# Patient Record
Sex: MALE | Race: OTHER | NOT HISPANIC OR LATINO | URBAN - METROPOLITAN AREA
[De-identification: names, ages, dates, MRNs, and addresses within clinical notes are randomized per-mention and may not be internally consistent; named-entity substitution may affect disease eponyms.]

---

## 2017-07-10 ENCOUNTER — INPATIENT (INPATIENT)
Facility: HOSPITAL | Age: 52
LOS: 2 days | Discharge: HOME | DRG: 246 | End: 2017-07-13
Attending: HOSPITALIST | Admitting: HOSPITALIST
Payer: MEDICAID

## 2017-07-10 VITALS
HEART RATE: 75 BPM | SYSTOLIC BLOOD PRESSURE: 108 MMHG | RESPIRATION RATE: 18 BRPM | OXYGEN SATURATION: 98 % | TEMPERATURE: 99 F | DIASTOLIC BLOOD PRESSURE: 72 MMHG

## 2017-07-10 DIAGNOSIS — R07.9 CHEST PAIN, UNSPECIFIED: ICD-10-CM

## 2017-07-10 DIAGNOSIS — I24.9 ACUTE ISCHEMIC HEART DISEASE, UNSPECIFIED: ICD-10-CM

## 2017-07-10 DIAGNOSIS — Z29.9 ENCOUNTER FOR PROPHYLACTIC MEASURES, UNSPECIFIED: ICD-10-CM

## 2017-07-10 DIAGNOSIS — I10 ESSENTIAL (PRIMARY) HYPERTENSION: ICD-10-CM

## 2017-07-10 DIAGNOSIS — I20.9 ANGINA PECTORIS, UNSPECIFIED: ICD-10-CM

## 2017-07-10 DIAGNOSIS — K21.9 GASTRO-ESOPHAGEAL REFLUX DISEASE WITHOUT ESOPHAGITIS: ICD-10-CM

## 2017-07-10 DIAGNOSIS — E78.5 HYPERLIPIDEMIA, UNSPECIFIED: ICD-10-CM

## 2017-07-10 LAB
ALBUMIN SERPL ELPH-MCNC: 4.5 G/DL — SIGNIFICANT CHANGE UP (ref 3.3–5)
ALP SERPL-CCNC: 45 U/L — SIGNIFICANT CHANGE UP (ref 40–120)
ALT FLD-CCNC: 69 U/L RC — HIGH (ref 10–45)
ANION GAP SERPL CALC-SCNC: 13 MMOL/L — SIGNIFICANT CHANGE UP (ref 5–17)
APTT BLD: 28.6 SEC — SIGNIFICANT CHANGE UP (ref 27.5–37.4)
APTT BLD: > 200 SEC (ref 27.5–37.4)
AST SERPL-CCNC: 33 U/L — SIGNIFICANT CHANGE UP (ref 10–40)
BASOPHILS # BLD AUTO: 0 K/UL — SIGNIFICANT CHANGE UP (ref 0–0.2)
BASOPHILS NFR BLD AUTO: 0.6 % — SIGNIFICANT CHANGE UP (ref 0–2)
BILIRUB SERPL-MCNC: 0.5 MG/DL — SIGNIFICANT CHANGE UP (ref 0.2–1.2)
BUN SERPL-MCNC: 17 MG/DL — SIGNIFICANT CHANGE UP (ref 7–23)
CALCIUM SERPL-MCNC: 9.3 MG/DL — SIGNIFICANT CHANGE UP (ref 8.4–10.5)
CHLORIDE SERPL-SCNC: 100 MMOL/L — SIGNIFICANT CHANGE UP (ref 96–108)
CHOLEST SERPL-MCNC: 140 MG/DL — SIGNIFICANT CHANGE UP (ref 10–199)
CK MB BLD-MCNC: 2.1 % — SIGNIFICANT CHANGE UP (ref 0–3.5)
CK MB CFR SERPL CALC: 2.2 NG/ML — SIGNIFICANT CHANGE UP (ref 0–6.7)
CK MB CFR SERPL CALC: 2.2 NG/ML — SIGNIFICANT CHANGE UP (ref 0–6.7)
CK MB CFR SERPL CALC: 2.4 NG/ML — SIGNIFICANT CHANGE UP (ref 0–6.7)
CK SERPL-CCNC: 106 U/L — SIGNIFICANT CHANGE UP (ref 30–200)
CK SERPL-CCNC: 82 U/L — SIGNIFICANT CHANGE UP (ref 30–200)
CK SERPL-CCNC: 87 U/L — SIGNIFICANT CHANGE UP (ref 30–200)
CO2 SERPL-SCNC: 25 MMOL/L — SIGNIFICANT CHANGE UP (ref 22–31)
CREAT SERPL-MCNC: 0.99 MG/DL — SIGNIFICANT CHANGE UP (ref 0.5–1.3)
EOSINOPHIL # BLD AUTO: 0.2 K/UL — SIGNIFICANT CHANGE UP (ref 0–0.5)
EOSINOPHIL NFR BLD AUTO: 2.1 % — SIGNIFICANT CHANGE UP (ref 0–6)
GLUCOSE SERPL-MCNC: 112 MG/DL — HIGH (ref 70–99)
HCT VFR BLD CALC: 50.8 % — HIGH (ref 39–50)
HCT VFR BLD CALC: 53.3 % — HIGH (ref 39–50)
HDLC SERPL-MCNC: 47 MG/DL — SIGNIFICANT CHANGE UP (ref 40–125)
HGB BLD-MCNC: 16.9 G/DL — SIGNIFICANT CHANGE UP (ref 13–17)
HGB BLD-MCNC: 18 G/DL — HIGH (ref 13–17)
INR BLD: 1.25 RATIO — HIGH (ref 0.88–1.16)
LIDOCAIN IGE QN: 58 U/L — SIGNIFICANT CHANGE UP (ref 7–60)
LIPID PNL WITH DIRECT LDL SERPL: 74 MG/DL — SIGNIFICANT CHANGE UP
LYMPHOCYTES # BLD AUTO: 3.4 K/UL — HIGH (ref 1–3.3)
LYMPHOCYTES # BLD AUTO: 46 % — HIGH (ref 13–44)
MCHC RBC-ENTMCNC: 29.9 PG — SIGNIFICANT CHANGE UP (ref 27–34)
MCHC RBC-ENTMCNC: 30.6 PG — SIGNIFICANT CHANGE UP (ref 27–34)
MCHC RBC-ENTMCNC: 33.3 GM/DL — SIGNIFICANT CHANGE UP (ref 32–36)
MCHC RBC-ENTMCNC: 33.8 GM/DL — SIGNIFICANT CHANGE UP (ref 32–36)
MCV RBC AUTO: 89.9 FL — SIGNIFICANT CHANGE UP (ref 80–100)
MCV RBC AUTO: 90.5 FL — SIGNIFICANT CHANGE UP (ref 80–100)
MONOCYTES # BLD AUTO: 0.9 K/UL — SIGNIFICANT CHANGE UP (ref 0–0.9)
MONOCYTES NFR BLD AUTO: 11.9 % — SIGNIFICANT CHANGE UP (ref 2–14)
NEUTROPHILS # BLD AUTO: 2.9 K/UL — SIGNIFICANT CHANGE UP (ref 1.8–7.4)
NEUTROPHILS NFR BLD AUTO: 39.4 % — LOW (ref 43–77)
NT-PROBNP SERPL-SCNC: 222 PG/ML — SIGNIFICANT CHANGE UP (ref 0–300)
PLATELET # BLD AUTO: 220 K/UL — SIGNIFICANT CHANGE UP (ref 150–400)
PLATELET # BLD AUTO: 239 K/UL — SIGNIFICANT CHANGE UP (ref 150–400)
POTASSIUM SERPL-MCNC: 4.9 MMOL/L — SIGNIFICANT CHANGE UP (ref 3.5–5.3)
POTASSIUM SERPL-SCNC: 4.9 MMOL/L — SIGNIFICANT CHANGE UP (ref 3.5–5.3)
PROT SERPL-MCNC: 7.6 G/DL — SIGNIFICANT CHANGE UP (ref 6–8.3)
PROTHROM AB SERPL-ACNC: 13.6 SEC — HIGH (ref 9.8–12.7)
RBC # BLD: 5.65 M/UL — SIGNIFICANT CHANGE UP (ref 4.2–5.8)
RBC # BLD: 5.89 M/UL — HIGH (ref 4.2–5.8)
RBC # FLD: 11.7 % — SIGNIFICANT CHANGE UP (ref 10.3–14.5)
RBC # FLD: 11.8 % — SIGNIFICANT CHANGE UP (ref 10.3–14.5)
SODIUM SERPL-SCNC: 138 MMOL/L — SIGNIFICANT CHANGE UP (ref 135–145)
TOTAL CHOLESTEROL/HDL RATIO MEASUREMENT: 3 RATIO — LOW (ref 3.4–9.6)
TRIGL SERPL-MCNC: 96 MG/DL — SIGNIFICANT CHANGE UP (ref 10–149)
TROPONIN T SERPL-MCNC: <0.01 NG/ML — SIGNIFICANT CHANGE UP (ref 0–0.06)
WBC # BLD: 6.6 K/UL — SIGNIFICANT CHANGE UP (ref 3.8–10.5)
WBC # BLD: 7.3 K/UL — SIGNIFICANT CHANGE UP (ref 3.8–10.5)
WBC # FLD AUTO: 6.6 K/UL — SIGNIFICANT CHANGE UP (ref 3.8–10.5)
WBC # FLD AUTO: 7.3 K/UL — SIGNIFICANT CHANGE UP (ref 3.8–10.5)

## 2017-07-10 PROCEDURE — 93010 ELECTROCARDIOGRAM REPORT: CPT

## 2017-07-10 PROCEDURE — 99223 1ST HOSP IP/OBS HIGH 75: CPT

## 2017-07-10 PROCEDURE — 99223 1ST HOSP IP/OBS HIGH 75: CPT | Mod: GC

## 2017-07-10 PROCEDURE — 99285 EMERGENCY DEPT VISIT HI MDM: CPT | Mod: 25

## 2017-07-10 PROCEDURE — 71010: CPT | Mod: 26

## 2017-07-10 RX ORDER — HEPARIN SODIUM 5000 [USP'U]/ML
4000 INJECTION INTRAVENOUS; SUBCUTANEOUS EVERY 6 HOURS
Qty: 0 | Refills: 0 | Status: DISCONTINUED | OUTPATIENT
Start: 2017-07-10 | End: 2017-07-10

## 2017-07-10 RX ORDER — ATORVASTATIN CALCIUM 80 MG/1
80 TABLET, FILM COATED ORAL AT BEDTIME
Qty: 0 | Refills: 0 | Status: DISCONTINUED | OUTPATIENT
Start: 2017-07-10 | End: 2017-07-13

## 2017-07-10 RX ORDER — HEPARIN SODIUM 5000 [USP'U]/ML
INJECTION INTRAVENOUS; SUBCUTANEOUS
Qty: 25000 | Refills: 0 | Status: DISCONTINUED | OUTPATIENT
Start: 2017-07-10 | End: 2017-07-10

## 2017-07-10 RX ORDER — HEPARIN SODIUM 5000 [USP'U]/ML
5000 INJECTION INTRAVENOUS; SUBCUTANEOUS EVERY 8 HOURS
Qty: 0 | Refills: 0 | Status: DISCONTINUED | OUTPATIENT
Start: 2017-07-11 | End: 2017-07-13

## 2017-07-10 RX ORDER — HEPARIN SODIUM 5000 [USP'U]/ML
4000 INJECTION INTRAVENOUS; SUBCUTANEOUS ONCE
Qty: 0 | Refills: 0 | Status: COMPLETED | OUTPATIENT
Start: 2017-07-10 | End: 2017-07-10

## 2017-07-10 RX ORDER — SODIUM CHLORIDE 9 MG/ML
3 INJECTION INTRAMUSCULAR; INTRAVENOUS; SUBCUTANEOUS ONCE
Qty: 0 | Refills: 0 | Status: COMPLETED | OUTPATIENT
Start: 2017-07-10 | End: 2017-07-10

## 2017-07-10 RX ORDER — CLOPIDOGREL BISULFATE 75 MG/1
75 TABLET, FILM COATED ORAL DAILY
Qty: 0 | Refills: 0 | Status: DISCONTINUED | OUTPATIENT
Start: 2017-07-10 | End: 2017-07-13

## 2017-07-10 RX ORDER — ASPIRIN/CALCIUM CARB/MAGNESIUM 324 MG
325 TABLET ORAL DAILY
Qty: 0 | Refills: 0 | Status: DISCONTINUED | OUTPATIENT
Start: 2017-07-10 | End: 2017-07-13

## 2017-07-10 RX ADMIN — SODIUM CHLORIDE 3 MILLILITER(S): 9 INJECTION INTRAMUSCULAR; INTRAVENOUS; SUBCUTANEOUS at 01:39

## 2017-07-10 RX ADMIN — ATORVASTATIN CALCIUM 80 MILLIGRAM(S): 80 TABLET, FILM COATED ORAL at 21:46

## 2017-07-10 RX ADMIN — CLOPIDOGREL BISULFATE 75 MILLIGRAM(S): 75 TABLET, FILM COATED ORAL at 21:45

## 2017-07-10 RX ADMIN — HEPARIN SODIUM 800 UNIT(S)/HR: 5000 INJECTION INTRAVENOUS; SUBCUTANEOUS at 03:33

## 2017-07-10 RX ADMIN — HEPARIN SODIUM 4000 UNIT(S): 5000 INJECTION INTRAVENOUS; SUBCUTANEOUS at 03:33

## 2017-07-10 RX ADMIN — Medication 325 MILLIGRAM(S): at 21:45

## 2017-07-10 NOTE — H&P ADULT - HISTORY OF PRESENT ILLNESS
52 yo M with PMH of acute MI, HTN, HLD and GERD who was seen in [facility] with increased trops and EKG changes and transferred to [facility] for ACS rule out. He initially felt chest pain with radiation down left arm in [place] on June 29th and was diagnosed with an acute MI. He was loaded with Brilinta and ASA and told that it would be several weeks before he could get an echo or cath. He flew back to the [place] on Saturday to seek more medical care. He currently endorses chest pressure and dizziness when walking ~10 blocks. Symptoms resolve with rest. He denies SOB, orthopnea, LE edema, nausea, vomiting, extremity tingling or weakness. He reports that he has been taking brilinta, asa and crestor for the past 2 weeks. He does not currently see a PMD because he has no insurance.

## 2017-07-10 NOTE — H&P ADULT - NSHPSOCIALHISTORY_GEN_ALL_CORE
Social History:    Marital Status:  (   )    (   ) Single    (   )    (  )   Occupation:   Lives with: (  ) alone  (  ) children   (  ) spouse   (  ) parents  (  ) other    Substance Use (street drugs): (  ) never used  (  ) other:  Tobacco Usage:  (   ) never smoked   (   ) former smoker   (   ) current smoker  (     ) pack year  (        ) last cigarette date  Alcohol Usage:  Sexual History:

## 2017-07-10 NOTE — H&P ADULT - NSHPPHYSICALEXAM_GEN_ALL_CORE
PHYSICAL EXAM:  GENERAL: NAD, well-groomed, well-developed, sitting comfortably on stretcher  EYES: PERRLA, conjunctiva with mild erythema and sclera clear  ENMT: Moist mucous membranes, Good dentition, No lesions  CHEST/LUNG: Clear to percussion bilaterally; No rales, rhonchi, wheezing, or rubs  HEART: Regular rate and rhythm; No murmurs, rubs, or gallops  ABDOMEN: Soft, Nontender, Nondistended; Bowel sounds present  VASCULAR:  2+ Peripheral Pulses, No clubbing, cyanosis, or edema  SKIN: No rashes or lesions  NERVOUS SYSTEM:  Alert & Oriented X3, Good concentration; no focal deficits

## 2017-07-10 NOTE — H&P ADULT - NSHPREVIEWOFSYSTEMS_GEN_ALL_CORE
REVIEW OF SYSTEMS:  CONSTITUTIONAL: No fever, weight loss, or fatigue  EYES: No eye pain, visual disturbances, or discharge  ENMT:  No difficulty hearing, tinnitus, vertigo; No sinus or throat pain  NECK: No pain or stiffness  BREASTS: No pain, masses, or nipple discharge  RESPIRATORY: No cough, wheezing, chills or hemoptysis; No shortness of breath  CARDIOVASCULAR: + Chest pain, dizziness, No palpitations, or leg swelling  GASTROINTESTINAL: No abdominal or epigastric pain. No nausea, vomiting, or hematemesis; No diarrhea or constipation. No melena or hematochezia.  GENITOURINARY: No dysuria, frequency, hematuria, or incontinence  NEUROLOGICAL: No headaches, memory loss, loss of strength, numbness, or tremors  SKIN: No itching, burning, rashes, or lesions   LYMPH NODES: No enlarged glands  ENDOCRINE: No heat or cold intolerance; No hair loss  MUSCULOSKELETAL: No joint pain or swelling; No muscle, back, or extremity pain  PSYCHIATRIC: No depression, anxiety, mood swings, or difficulty sleeping  HEME/LYMPH: No easy bruising, or bleeding gums  ALLERY AND IMMUNOLOGIC: No hives or eczema

## 2017-07-10 NOTE — ED ADULT NURSE REASSESSMENT NOTE - NS ED NURSE REASSESS COMMENT FT1
Pt received from [facility] in [place], alert and oriented times three, breathing spontaneous, unlabored without distress on room air, NAD, denies chest pain SOB, CM NSR, on heparin drip @ 8mg(8000unit)/hr, admitted to tele for ACS, awaits bed

## 2017-07-10 NOTE — ED PROVIDER NOTE - ATTENDING CONTRIBUTION TO CARE
52yo M hx of HTN, HLD, gastritis, recent STEMI in [place] 6/29/17 transferred to [facility] from [facility] for possible cardiac cath. Pt reportedly w STEMI in [place] managed medically w IV metalyse(tpa), Brilinta and ASA. Returned home from [place] and had CP and dizziness w exertion, went to [facility]. Trops inc to 0.011. Loaded w ASA 325mg and Plavvix 600mg and transferred. Pt currently pain free. On exam well appearing NAD, neck supple no JVD, RRR no murmur, CTA BL, no wrr, abd soft NTND, ext wwp, neuro intact. Plan: Labs, CXR, CCM, EKG, consult to cardio who accepted pt for transfer.

## 2017-07-10 NOTE — CONSULT NOTE ADULT - ATTENDING COMMENTS
51 year old man, first cardiac presentation, acute inferior wall MI in [place]. Was told there was a long waiting list for coronary angiography there and left flying to this country where he has lived and worked for 20 years until recent return to be with his family in [place]. At this time MI is a completed event and unless having post-MI symptoms/ischemia there is no urgent role for intervention.    Need cardiac echo, consider stress testing about 2 weeks post initial event. Need high intensity statin, aspirin, Plavix, but does not need Heparin infusion.

## 2017-07-10 NOTE — ED ADULT NURSE NOTE - OBJECTIVE STATEMENT
51 year old male pt presented to the ED via ems from [facility] stating pt was in [place] and diagnosed with an MI there, not treated and released, [place] in [place] yesterday was SOB while walking with dizziness, pt now with burning discomfort to chest, no SOB, n/v/d or dizziness

## 2017-07-10 NOTE — ED PROVIDER NOTE - MEDICAL DECISION MAKING DETAILS
51y Male PMH gastritis on omeprazole, HTN, HLD, STEMI in [place] 6/29/2017 treated with medical management (IV metalyze 8,000 units, on Brilinta and ASA and monitored for 6 days) transferred to ED for cardiology consult for increased troponins and EKG changes, will obtain labs, cardiology consult, heparin drip, admit for cardiac catheterization. 51y Male PMH gastritis on omeprazole, HTN, HLD, STEMI in [place] 6/29/2017 treated with medical management (IV metalyze 8,000 units, on Brilinta and ASA and monitored for 6 days) transferred to ED for cardiology consult for increased troponins and EKG changes, will obtain labs, cardiology consult, heparin drip, admit for cardiac catheterization.  Arnoldo: See attending statement below

## 2017-07-10 NOTE — H&P ADULT - PMH
GERD (gastroesophageal reflux disease)    HTN (hypertension)    Hyperlipidemia, unspecified hyperlipidemia type

## 2017-07-10 NOTE — PROVIDER CONTACT NOTE (CRITICAL VALUE NOTIFICATION) - ACTION/TREATMENT ORDERED:
heparin drip discontinued. Pt monitored. heparin drip discontinued. Pt monitored. Team 2 paged multiple times awaiting call back. heparin drip discontinued. Pt monitored. Team 2 paged multiple times awaiting call back.   Team called back. Md made aware. No further orders.

## 2017-07-10 NOTE — CONSULT NOTE ADULT - PROBLEM SELECTOR RECOMMENDATION 9
Denise Chavez [facility] NP   #[id number] Possible UA/NSTEMI   CE very mildly elevated @ OSH, CE neg here, currently CP free. ASA/plavix loaded @ OSH, ACS dose heparin started in ED. c/w medical management (heparin gtt, ASA, plavix, high intensity statin), serial cardiac enzymes and EKG, if recurrent CP, check EKG for dynamic changes, avoid nitro given inferior wall MI   TTE in AM, needs ischemic eval.   Monitor on tele  Obtain records from hospitalization in [place] as patient is not completely clear on medications given during hospital stay     Denise Chavez [facility] NP   #[id number] Concern for UA/NSTEMI given CE elevation (mild) @ OSH and dynamic EKG changes.  CE neg here, currently CP free. ASA/plavix loaded @ OSH, ACS dose heparin started in ED. c/w medical management (heparin gtt, ASA, plavix, high intensity statin), serial cardiac enzymes and EKG, if recurrent CP, check EKG for dynamic changes, avoid nitro in setting of recent inferior wall MI   TTE in AM, needs ischemic eval.   Monitor on tele  Obtain records from hospitalization in [place] as patient is not completely clear on medications given during hospital stay     Denise Chavez [facility] NP   #[id number] Concern for UA/NSTEMI given CE elevation (mild) @ OSH and dynamic EKG changes @ OSH.  CE neg here, currently CP free. ASA/plavix loaded @ OSH, ACS dose heparin started in ED. c/w medical management (heparin gtt, ASA, plavix, high intensity statin), serial cardiac enzymes and EKG, if recurrent CP, check EKG for dynamic changes, avoid nitro in setting of recent inferior wall MI   TTE in AM  Monitor on tele  Obtain records from hospitalization in [place] as patient is not completely clear on medications given during hospital stay     Denise Chavez [facility] NP   #[id number] Recent inferior wall MI in [place], now and essentially completed event.  Episode of chest discomfort yesterday not clearly characteristic of angina.  CE neg here, currently CP free. ASA/plavix loaded @ OSH, ACS dose heparin started in ED. c/w medical management, ASA, Plavix, high intensity statin), serial cardiac enzymes and EKG, if recurrent CP, check EKG for dynamic changes, avoid nitro in setting of recent inferior wall MI   TTE in AM  Monitor on tele  Obtain records from hospitalization in [place] as patient is not completely clear on medications given during hospital stay     Denise Chavez [facility] NP   #[id number]

## 2017-07-10 NOTE — CONSULT NOTE ADULT - ASSESSMENT
50 yo M hx gastritis, HTN, HLD MI 6/29/17, medically mangaged in [place] w/ IV metalyse, SL nitro, baby ASA, brilinta or plavix, crestor and possibly AC, was CP free until 2 episodes of exertional chest pain that resolved with rest this AM. Presented to [facility], 2nd trop 0.011 w/ reported dynamic EKG changes, given loading dose of ASA and plavix and transferred to [facility] for further evaluation. Currently CP free

## 2017-07-10 NOTE — CONSULT NOTE ADULT - SUBJECTIVE AND OBJECTIVE BOX
CHIEF COMPLAINT: epigastric burning     HPI:  50 yo M hx gastritis, HTN, HLD who lives in [place] and in [place], recently hospitalized 6/29/17 x 6 days with [facility] in [place]. He reports being medically managed (no records availble) w/ IV metalyse 8000 un, SL nitro x 2 for 2 episodes of CP, baby ASA, either brilinta or plavix, and possible AC?. He reports he was chest pain free and took 4.5 hour flight Friday night back to [place]. He woke up yesterday and walked 10 blocks, he developed epigastric burning w/ associated L chest pain radiating to his L arm and lightheadedness, like the pain he had in [place] but less intense, /10. He reports it was similar to reflux but more intense. The pain went away when he rested. He then walked approximately 1 hour later and developed the same pain, the pain went away w/ rest. No dyspnea, palpitations, FERNANDEZ, does not change w/ cough     PAST MEDICAL & SURGICAL HISTORY:  HTN (hypertension)  CAD s/p STEMI (17)       PREVIOUS DIAGNOSTIC TESTING::     HOME MEDICATIONS :     MEDICATIONS  (STANDING):    MEDICATIONS  (PRN):      FAMILY HISTORY:      Social History:    Marital Status:   Occupation:   Lives with:     Substance Use :  Tobacco Usage:  (   ) never smoked   (   ) former smoker   (   ) current smoker  (     ) pack year  (        ) last tobacco use date  Alcohol Usage:      Health Management     For female:   Last Mammo:   Last Pap:     For male:  Last prostate exam:          [  ] date:            (  ) findings      Immunization Hx:   (  ) flu shot                               (     ) date   (  ) pneumonia shot               (     ) date  (  ) tetanus                               (     ) date     (     ) Advanced Directives: (     ) declined   [  ] health care proxy    Allergies    No Known Allergies    Intolerances        REVIEW OF SYSTEMS:   General:   HEENT:  Cardiovascular:   Respiratory:   Gastrointestinal:  Genitourinary:   Integumentary:   Musculoskeletal:   Hematologic/Lymphatic:   Endocrine:   Neurologic:   Psychologic:   All others negative except as stated above or in HPI.    Vital Signs Last 24 Hrs  T(C): 36.5 (10 Jul 2017 01:05), Max: 37 (10 Jul 2017 01:02)  T(F): 97.7 (10 Jul 2017 01:05), Max: 98.6 (10 Jul 2017 01:02)  HR: 65 (10 Jul 2017 01:05) (65 - 75)  BP: 128/97 (10 Jul 2017 01:05) (108/72 - 128/97)  BP(mean): --  RR: 18 (10 Jul 2017 01:05) (18 - 18)  SpO2: 98% (10 Jul 2017 01:05) (98% - 98%)  ICU Vital Signs Last 24 Hrs  T(C): 36.5 (10 Jul 2017 01:05), Max: 37 (10 Jul 2017 01:02)  T(F): 97.7 (10 Jul 2017 01:05), Max: 98.6 (10 Jul 2017 01:02)  HR: 65 (10 Jul 2017 01:05) (65 - 75)  BP: 128/97 (10 Jul 2017 01:05) (108/72 - 128/97)  BP(mean): --  ABP: --  ABP(mean): --  RR: 18 (10 Jul 2017 01:05) (18 - 18)  SpO2: 98% (10 Jul 2017 01:05) (98% - 98%)    I&O's Summary    Daily     Daily     PHYSICAL EXAM:   General:   HEENT:  Cardiovascular:   Respiratory:   Gastrointestinal:  Genitourinary:   Integumentary:   Musculoskeletal:   Hematologic/Lymphatic:   Endocrine:   Neurologic:   Psychologic:     TELEMETRY:     EKG:     CARDIAC CATH:     ECHO:    IMAGIN.9   7.3   )-----------( 239      ( 10 Jul 2017 01:30 )             50.8     0710    138  |  100  |  17  ----------------------------<  112<H>  4.9   |  25  |  0.99    Ca    9.3      10 Jul 2017 01:30    TPro  7.6  /  Alb  4.5  /  TBili  0.5  /  DBili  x   /  AST  33  /  ALT  69<H>  /  AlkPhos  45  10    LIVER FUNCTIONS - ( 10 Jul 2017 01:30 )  Alb: 4.5 g/dL / Pro: 7.6 g/dL / ALK PHOS: 45 U/L / ALT: 69 U/L RC / AST: 33 U/L / GGT: x           Creatine Kinase, Serum: 106 U/L (07-10 @ 01:30)  CKMB Units: 2.2 ng/mL (07-10 @ 01:30)  Troponin T, Serum: <0.01 ng/mL (07-10 @ 01:30)    PT/INR - ( 10 Jul 2017 01:30 )   PT: 13.6 sec;   INR: 1.25 ratio         PTT - ( 10 Jul 2017 01:30 )  PTT:28.6 sec    *BLOOD GAS/ARTERIAL/MIXED/VENOUS  *LACTATE      HEALTH ISSUES - PROBLEM Dx:        HEALTH ISSUES - R/O PROBLEM Dx: CHIEF COMPLAINT: epigastric burning     HPI:  50 yo M hx gastritis, HTN, HLD who lives in [place] and in [place], recently hospitalized 6/29/17 x 6 days with MI @ [facility] in [place]. He reports being medically managed (no records availble) w/ IV metalyse 8000 un, SL nitro x 2 for 2 episodes of CP, baby ASA, either brilinta or plavix, crestor and possible AC?. He reports he was chest pain free after medical management and 2 doses of SL nitro and took a 4.5 hour flight Friday night back to [place]. He woke up yesterday and walked 10 blocks, he developed epigastric burning w/ associated L chest pain radiating to his L arm and lightheadedness, like the pain he had in [place] but less intense, /10. He reports it was similar to reflux but more intense. The pain went away when he rested. He then walked approximately 1 hour later and developed the same pain, the pain went away w/ rest. No dyspnea, palpitations, FERNANDEZ, no pleuritic pain, no leg swelling or pain, no orthopnea, no PND, no N/V, no dizziness, no diaphoresis, no syncope, no near syncope. Denies history of angiogram, stress test, echo. Denies prior cardiac history, denies being DCed on ACE/ARB/BB.   Patient went to [facility], 2nd troponin 0.011 w/ dynamic EKG changes     PAST MEDICAL & SURGICAL HISTORY:  HTN (hypertension)  HLD  CAD s/p STEMI (17)   Gastritis     PAST SURGICAL HISTORY:   none     HOME MEDICATIONS :     MEDICATIONS  (STANDING):    MEDICATIONS  (PRN):    FAMILY HISTORY:  Father - DM, HLD, HTN, CAD (unknown age first MI,  of MI @ 64)     SOCIAL HISTORY    Marital Status:   Occupation:   Lives with:     Substance Use :  Tobacco Usage:  (   ) never smoked   (   ) former smoker   (   ) current smoker  (     ) pack year  (        ) last tobacco use date  Alcohol Usage:      Health Management     For female:   Last Mammo:   Last Pap:     For male:  Last prostate exam:          [  ] date:            (  ) findings      Immunization Hx:   (  ) flu shot                               (     ) date   (  ) pneumonia shot               (     ) date  (  ) tetanus                               (     ) date     (     ) Advanced Directives: (     ) declined   [  ] health care proxy    Allergies    No Known Allergies    Intolerances        REVIEW OF SYSTEMS:   General:   HEENT:  Cardiovascular:   Respiratory:   Gastrointestinal:  Genitourinary:   Integumentary:   Musculoskeletal:   Hematologic/Lymphatic:   Endocrine:   Neurologic:   Psychologic:   All others negative except as stated above or in HPI.    Vital Signs Last 24 Hrs  T(C): 36.5 (10 Jul 2017 01:05), Max: 37 (10 Jul 2017 01:02)  T(F): 97.7 (10 Jul 2017 01:05), Max: 98.6 (10 Jul 2017 01:02)  HR: 65 (10 Jul 2017 01:05) (65 - 75)  BP: 128/97 (10 Jul 2017 01:05) (108/72 - 128/97)  BP(mean): --  RR: 18 (10 Jul 2017 01:05) (18 - 18)  SpO2: 98% (10 Jul 2017 01:) (98% - 98%)  ICU Vital Signs Last 24 Hrs  T(C): 36.5 (10 Jul 2017 01:05), Max: 37 (10 Jul 2017 01:02)  T(F): 97.7 (10 Jul 2017 01:05), Max: 98.6 (10 Jul 2017 01:02)  HR: 65 (10 Jul 2017 01:05) (65 - 75)  BP: 128/97 (10 Jul 2017 01:05) (108/72 - 128/97)  BP(mean): --  ABP: --  ABP(mean): --  RR: 18 (10 Jul 2017 01:05) (18 - 18)  SpO2: 98% (10 Jul 2017 01:05) (98% - 98%)    I&O's Summary    Daily     Daily     PHYSICAL EXAM:   General:   HEENT:  Cardiovascular:   Respiratory:   Gastrointestinal:  Genitourinary:   Integumentary:   Musculoskeletal:   Hematologic/Lymphatic:   Endocrine:   Neurologic:   Psychologic:     TELEMETRY:     EKG:     CARDIAC CATH:     ECHO:    IMAGIN.9   7.3   )-----------( 239      ( 10 Jul 2017 01:30 )             50.8     07-10    138  |  100  |  17  ----------------------------<  112<H>  4.9   |  25  |  0.99    Ca    9.3      10 Jul 2017 01:    TPro  7.6  /  Alb  4.5  /  TBili  0.5  /  DBili  x   /  AST  33  /  ALT  69<H>  /  AlkPhos  45  07-10    LIVER FUNCTIONS - ( 10 Jul 2017 01:30 )  Alb: 4.5 g/dL / Pro: 7.6 g/dL / ALK PHOS: 45 U/L / ALT: 69 U/L RC / AST: 33 U/L / GGT: x           Creatine Kinase, Serum: 106 U/L (07-10 @ 01:30)  CKMB Units: 2.2 ng/mL (07-10 @ 01:30)  Troponin T, Serum: <0.01 ng/mL (07-10 @ 01:30)    PT/INR - ( 10 Jul 2017 01:30 )   PT: 13.6 sec;   INR: 1.25 ratio         PTT - ( 10 Jul 2017 01:30 )  PTT:28.6 sec    *BLOOD GAS/ARTERIAL/MIXED/VENOUS  *LACTATE      HEALTH ISSUES - PROBLEM Dx:        HEALTH ISSUES - R/O PROBLEM Dx: CHIEF COMPLAINT: epigastric burning     HPI:  52 yo M hx gastritis, HTN, HLD who lives in [place] and in [place], recently hospitalized 6/29/17 x 6 days with MI @ [facility] in [place]. He reports being medically managed (no records availble) w/ IV metalyse 8000 un, SL nitro x 2 for 2 episodes of CP, baby ASA, either brilinta or plavix, crestor and possible AC?. He reports he was chest pain free after medical management and 2 doses of SL nitro and took a 4.5 hour flight Friday night back to [place]. He woke up yesterday and walked 10 blocks, he developed epigastric burning w/ associated L chest pain radiating to his L arm and lightheadedness, like the pain he had in [place] but less intense, /10. He reports it was similar to reflux but more intense. The pain went away when he rested. He then walked approximately 1 hour later and developed the same pain, the pain went away w/ rest. No dyspnea, palpitations, FERNANDEZ, no pleuritic pain, no leg swelling or pain, no orthopnea, no PND, no N/V, no dizziness, no diaphoresis, no syncope, no near syncope. Denies history of angiogram, stress test, echo. Denies prior cardiac history, denies being DCed on ACE/ARB/BB.     Patient went to [facility], 2nd troponin 0.011 w/ dynamic EKG changes and patient transferred to [facility] ED for further evaluation after getting  mg and Plavix 600 mg. In ED, patient w/ stable VS, cardiac enzymes negative     PAST MEDICAL & SURGICAL HISTORY:  HTN (hypertension)  HLD  CAD s/p STEMI (17)   Gastritis     PAST SURGICAL HISTORY:   none     HOME MEDICATIONS :     MEDICATIONS  (STANDING):    MEDICATIONS  (PRN):    FAMILY HISTORY:  Father - DM, HLD, HTN, CAD (unknown age first MI,  of MI @ 64)     SOCIAL HISTORY    Marital Status:   Occupation:   Lives with:     Substance Use :  Tobacco Usage:  (   ) never smoked   (   ) former smoker   (   ) current smoker  (     ) pack year  (        ) last tobacco use date  Alcohol Usage:      Health Management     For female:   Last Mammo:   Last Pap:     For male:  Last prostate exam:          [  ] date:            (  ) findings      Immunization Hx:   (  ) flu shot                               (     ) date   (  ) pneumonia shot               (     ) date  (  ) tetanus                               (     ) date     (     ) Advanced Directives: (     ) declined   [  ] health care proxy    Allergies    No Known Allergies    Intolerances        REVIEW OF SYSTEMS:   General:   HEENT:  Cardiovascular:   Respiratory:   Gastrointestinal:  Genitourinary:   Integumentary:   Musculoskeletal:   Hematologic/Lymphatic:   Endocrine:   Neurologic:   Psychologic:   All others negative except as stated above or in HPI.    Vital Signs Last 24 Hrs  T(C): 36.5 (10 Jul 2017 01:05), Max: 37 (10 Jul 2017 01:02)  T(F): 97.7 (10 Jul 2017 01:05), Max: 98.6 (10 Jul 2017 01:02)  HR: 65 (10 Jul 2017 01:05) (65 - 75)  BP: 128/97 (10 Jul 2017 01:05) (108/72 - 128/97)  BP(mean): --  RR: 18 (10 Jul 2017 01:05) (18 - 18)  SpO2: 98% (10 Jul 2017 01:) (98% - 98%)  ICU Vital Signs Last 24 Hrs  T(C): 36.5 (10 Jul 2017 01:05), Max: 37 (10 Jul 2017 01:02)  T(F): 97.7 (10 Jul 2017 01:05), Max: 98.6 (10 Jul 2017 01:02)  HR: 65 (10 Jul 2017 01:05) (65 - 75)  BP: 128/97 (10 Jul 2017 01:05) (108/72 - 128/97)  BP(mean): --  ABP: --  ABP(mean): --  RR: 18 (10 Jul 2017 01:05) (18 - 18)  SpO2: 98% (10 Jul 2017 01:05) (98% - 98%)    I&O's Summary    Daily     Daily     PHYSICAL EXAM:   General:   HEENT:  Cardiovascular:   Respiratory:   Gastrointestinal:  Genitourinary:   Integumentary:   Musculoskeletal:   Hematologic/Lymphatic:   Endocrine:   Neurologic:   Psychologic:     TELEMETRY:     EKG:     CARDIAC CATH:     ECHO:    IMAGIN.9   7.3   )-----------( 239      ( 10 Jul 2017 01:30 )             50.8     07-10    138  |  100  |  17  ----------------------------<  112<H>  4.9   |  25  |  0.99    Ca    9.3      10 Jul 2017 01:30    TPro  7.6  /  Alb  4.5  /  TBili  0.5  /  DBili  x   /  AST  33  /  ALT  69<H>  /  AlkPhos  45  0710    LIVER FUNCTIONS - ( 10 Jul 2017 01:30 )  Alb: 4.5 g/dL / Pro: 7.6 g/dL / ALK PHOS: 45 U/L / ALT: 69 U/L RC / AST: 33 U/L / GGT: x           Creatine Kinase, Serum: 106 U/L (07-10 @ 01:30)  CKMB Units: 2.2 ng/mL (07-10 @ 01:30)  Troponin T, Serum: <0.01 ng/mL (07-10 @ 01:30)    PT/INR - ( 10 Jul 2017 01:30 )   PT: 13.6 sec;   INR: 1.25 ratio         PTT - ( 10 Jul 2017 01:30 )  PTT:28.6 sec    *BLOOD GAS/ARTERIAL/MIXED/VENOUS  *LACTATE      HEALTH ISSUES - PROBLEM Dx:        HEALTH ISSUES - R/O PROBLEM Dx: CHIEF COMPLAINT: epigastric burning     HPI:  52 yo M hx gastritis, HTN, HLD who lives in [place] and in [place], recently hospitalized 6/29/17 x 6 days with MI @ [facility] in [place]. He reports being medically managed (no records availble) w/ IV metalyse 8000 un, SL nitro x 2 for 2 episodes of CP, baby ASA, either brilinta or plavix, crestor and possible AC?. He reports he was chest pain free after medical management and 2 doses of SL nitro and took a 4.5 hour flight Friday night back to [place]. He woke up yesterday and walked 10 blocks, he developed epigastric burning w/ associated L chest pain radiating to his L arm and lightheadedness, like the pain he had in [place] but less intense, /10. He reports it was similar to reflux but more intense. The pain went away when he rested. He then walked approximately 1 hour later and developed the same pain, the pain went away w/ rest. No dyspnea, palpitations, FERNANDEZ, no pleuritic pain, no leg swelling or pain, no orthopnea, no PND, no N/V, no dizziness, no diaphoresis, no syncope, no near syncope. Has purposefully not been as active as usual since MI. Denies history of angiogram, stress test, echo. Denies prior cardiac history, no history DVT/PE, denies being DCed on ACE/ARB/BB.     Patient went to [facility], 2nd troponin 0.011 w/ dynamic EKG changes and patient transferred to [facility] ED for further evaluation after getting  mg and Plavix 600 mg. 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, splits time between [place] and   Substance Use :  Tobacco Usage: never smoker   Alcohol Usage: drinks up to 10 scotches on holidays rarely, no hx w/d   Denies illicit drug use     ALLERGIES:    No Known Allergies    REVIEW OF SYSTEMS:   General: no weight changes, no fevers, no chills, no sick contacts  HEENT:no sore throats, no rhinorrhea, no dysphagia,   Cardiovascular: see HPI   Respiratory: no cough, no hemoptysis   Gastrointestinal: no abd pain, no melena, no hematochezia, no coffee ground emesis, no hematemesis   Genitourinary: no urinary symptoms   Integumentary: no changes in skin, hair or nails   Musculoskeletal: no myalgias/arthralgias    Hematologic/Lymphatic: no easy bruising or bleeding   Endocrine: no hx DM   Neurologic: no syncope/near syncope. no HAs, no hx stroke  Psychologic: no increase anxiety/stress   All others negative except as stated above or in HPI.    Vital Signs Last 24 Hrs  T(C): 36.5 (10 Jul 2017 01:05), Max: 37 (10 Jul 2017 01:02)  T(F): 97.7 (10 Jul 2017 01:05), Max: 98.6 (10 Jul 2017 01:02)  HR: 65 (10 Jul 2017 01:05) (65 - 75)  BP: 128/97 (10 Jul 2017 01:05) (108/72 - 128/97)  RR: 18 (10 Jul 2017 01:05) (18 - 18)  SpO2: 98% (10 Jul 2017 01:05) (98% - 98%)    PHYSICAL EXAM:   General: NAD, well-nourished appearing  HEENT: oral mucosa moist, head atraumatic and normocephalic, trachea midline, neck supple   Cardiovascular: S1, S2, no M/R/G, RRR, no JVD, pulses 2+ UE & LE, no LE edema, no HJR   Respiratory: clear bilaterally, good rate and effort   Gastrointestinal: soft, non tender, + BS   Genitourinary: no CV angle tenderness   Integumentary: warm and dry   Musculoskeletal: moving all 4 extremities, chest wall not TTP   Hematologic/Lymphatic: no bleeding noted   Neurologic: A, A, O x 4, PERRL  Psychologic: mood and affect appropriate     TELEMETRY: sinus    EKG: sinus, q waves inferiorly, TWI III, avF                      16.9   7.3   )-----------( 239      ( 10 Jul 2017 01:30 )             50.8     07-10    138  |  100  |  17  ----------------------------<  112<H>  4.9   |  25  |  0.99    Ca    9.3      10 Jul 2017 01:30    TPro  7.6  /  Alb  4.5  /  TBili  0.5  /  DBili  x   /  AST  33  /  ALT  69<H>  /  AlkPhos  45  07-10    LIVER FUNCTIONS - ( 10 Jul 2017 01:30 )  Alb: 4.5 g/dL / Pro: 7.6 g/dL / ALK PHOS: 45 U/L / ALT: 69 U/L RC / AST: 33 U/L / GGT: x           Creatine Kinase, Serum: 106 U/L (07-10 @ 01:30)  CKMB Units: 2.2 ng/mL (07-10 @ 01:30)  Troponin T, Serum: <0.01 ng/mL (07-10 @ 01:30)    PT/INR - ( 10 Jul 2017 01:30 )   PT: 13.6 sec;   INR: 1.25 ratio      PTT - ( 10 Jul 2017 01:30 )  PTT:28.6 sec CHIEF COMPLAINT: epigastric burning     HPI:  50 yo M hx gastritis, HTN, HLD who lives in [place] and in [place], recently hospitalized 6/29/17 x 6 days with MI @ [facility] in [place]. He reports being medically managed (no records availble) w/ IV metalyse 8000 un, SL nitro x 2 for 2 episodes of CP, baby ASA, either Brilinta or Plavix, Crestor and possible AC?. He reports he was chest pain free after medical management and 2 doses of SL nitro and took a 4.5 hour flight Friday night back to [place]. He woke up yesterday and walked 10 blocks, he developed epigastric burning w/ associated L chest pain radiating to his L arm and lightheadedness, like the pain he had in [place] but less intense, /10. He reports it was similar to reflux but more intense. The pain went away when he rested. He then walked approximately 1 hour later and developed the same pain, the pain went away w/ rest. No dyspnea, palpitations, FERNANDEZ, no pleuritic pain, no leg swelling or pain, no orthopnea, no PND, no N/V, no dizziness, no diaphoresis, no syncope, no near syncope. Has purposefully not been as active as usual since MI. Denies history of angiogram, stress test, echo. Denies prior cardiac history, no history DVT/PE, denies being on ACE/ARB/BB.     Patient went to [facility], 2nd troponin 0.011 w/ dynamic EKG changes and patient transferred to [facility] ED for further evaluation after getting  mg and Plavix 600 mg. 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, splits time between [place] and   Substance Use :  Tobacco Usage: never smoker   Alcohol Usage: drinks up to 10 scotches on holidays rarely, no hx w/d   Denies illicit drug use     ALLERGIES:    No Known Allergies    REVIEW OF SYSTEMS:   General: no weight changes, no fevers, no chills, no sick contacts  HEENT:no sore throats, no rhinorrhea, no dysphagia,   Cardiovascular: see HPI   Respiratory: no cough, no hemoptysis   Gastrointestinal: no abd pain, no melena, no hematochezia, no coffee ground emesis, no hematemesis   Genitourinary: no urinary symptoms   Integumentary: no changes in skin, hair or nails   Musculoskeletal: no myalgias/arthralgias    Hematologic/Lymphatic: no easy bruising or bleeding   Endocrine: no hx DM   Neurologic: no syncope/near syncope. no HAs, no hx stroke  Psychologic: no increase anxiety/stress   All others negative except as stated above or in HPI.    Vital Signs Last 24 Hrs  T(C): 36.5 (10 Jul 2017 01:05), Max: 37 (10 Jul 2017 01:02)  T(F): 97.7 (10 Jul 2017 01:05), Max: 98.6 (10 Jul 2017 01:02)  HR: 65 (10 Jul 2017 01:05) (65 - 75)  BP: 128/97 (10 Jul 2017 01:05) (108/72 - 128/97)  RR: 18 (10 Jul 2017 01:05) (18 - 18)  SpO2: 98% (10 Jul 2017 01:05) (98% - 98%)    PHYSICAL EXAM:   General: NAD, well-nourished appearing  HEENT: oral mucosa moist, head atraumatic and normocephalic, trachea midline, neck supple   Cardiovascular: S1, S2, no M/R/G, RRR, no JVD, pulses 2+ UE & LE, no LE edema, no HJR   Respiratory: clear bilaterally, good rate and effort   Gastrointestinal: soft, non tender, + BS   Genitourinary: no CV angle tenderness   Integumentary: warm and dry   Musculoskeletal: moving all 4 extremities, chest wall not TTP   Hematologic/Lymphatic: no bleeding noted   Neurologic: A, A, O x 4, PERRL  Psychologic: mood and affect appropriate     TELEMETRY: sinus    EKG: sinus, q waves inferiorly, TWI III, avF                      16.9   7.3   )-----------( 239      ( 10 Jul 2017 01:30 )             50.8     07-10    138  |  100  |  17  ----------------------------<  112<H>  4.9   |  25  |  0.99    Ca    9.3      10 Jul 2017 01:30    TPro  7.6  /  Alb  4.5  /  TBili  0.5  /  DBili  x   /  AST  33  /  ALT  69<H>  /  AlkPhos  45  07-10    LIVER FUNCTIONS - ( 10 Jul 2017 01:30 )  Alb: 4.5 g/dL / Pro: 7.6 g/dL / ALK PHOS: 45 U/L / ALT: 69 U/L RC / AST: 33 U/L / GGT: x           Creatine Kinase, Serum: 106 U/L (07-10 @ 01:30)  CKMB Units: 2.2 ng/mL (07-10 @ 01:30)  Troponin T, Serum: <0.01 ng/mL (07-10 @ 01:30)    PT/INR - ( 10 Jul 2017 01:30 )   PT: 13.6 sec;   INR: 1.25 ratio      PTT - ( 10 Jul 2017 01:30 )  PTT:28.6 sec

## 2017-07-10 NOTE — H&P ADULT - PROBLEM SELECTOR PLAN 1
- [facility]ps and [facility] and loaded with plavix 600mg and asa 325mg   - Upon presentation here, EKG NSR without CASSIE or TWI, negative cardiac enzymes, stable VSS  - Cardiology following and rec's inpatient echo and outpatient follow up for stress test  - Continue plavix 75, asa 81 and atorvastatin 80mg  - F/u repeat cardiac enzymes

## 2017-07-10 NOTE — ED PROVIDER NOTE - OBJECTIVE STATEMENT
51y Male PMH gastritis on omeprazole, HTN, HLD, STEMI in [place] 6/29/2017 treated with medical management (IV metalyze 8,000 units, on Brilinta and ASA and monitored for 6 days) transferred to ED for cardiology consult for increased troponins and EKG changes. Presented with epigastric burning and dizziness with exertion. 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 51y Male PMH gastritis on omeprazole, HTN, HLD, STEMI in [place] 6/29/2017 treated with medical management (IV metalyze 8,000 units, on Brilinta and ASA and monitored for 6 days) transferred to ED for cardiology consult for increased troponins and EKG changes. Presented with epigastric burning and dizziness with exertion. 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 Given ASA and Plavix at OSH. 51y Male PMH gastritis on omeprazole, HTN, HLD, STEMI in [place] 6/29/2017 treated with medical management (IV metalyze 8,000 units, on Brilinta and ASA and monitored for 6 days) transferred to ED for cardiology consult for increased troponins and EKG changes. Presented with epigastric burning and dizziness with exertion in [facility]. 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 Given 325mg ASA and 600mg Plavix at OSH.

## 2017-07-10 NOTE — H&P ADULT - NSHPLABSRESULTS_GEN_ALL_CORE
07-10    138  |  100  |  17  ----------------------------<  112<H>  4.9   |  25  |  0.99    Ca    9.3      10 Jul 2017 01:30    TPro  7.6  /  Alb  4.5  /  TBili  0.5  /  DBili  x   /  AST  33  /  ALT  69<H>  /  AlkPhos  45  07-10      PT/INR - ( 10 Jul 2017 01:30 )   PT: 13.6 sec;   INR: 1.25 ratio         PTT - ( 10 Jul 2017 09:29 )  PTT:> 200 sec                                        18.0   6.6   )-----------( 220      ( 10 Jul 2017 09:29 )             53.3                         16.9   7.3   )-----------( 239      ( 10 Jul 2017 01:30 )             50.8     CAPILLARY BLOOD GLUCOSE        EKG 7/10/17: NSR without CASSIE or TWI

## 2017-07-10 NOTE — H&P ADULT - ASSESSMENT
52 yo M with PMH of acute MI (2 weeks ago medically managed in [place]), HTN, HLD and GERD who is transferred from outside hospital with increased trops and EKG changes and chest pain with exertion.

## 2017-07-10 NOTE — ED PROVIDER NOTE - PHYSICAL EXAMINATION
PE: CONSTITUTIONAL: Well appearing, well nourished, in no apparent distress. ENMT: Airway patent, nasal mucosa clear, mouth with normal mucosa. HEAD: NCAT EYES: PERRL, EOMI bilaterally CARDIAC: RRR, no m/r/g, no pedal edema RESPIRATORY: CTA bilaterally, no adventitious sounds GI: Abdomen non-distended, non-tender MSK: Spine appears normal, range of motion is not limited, no muscle/joint tenderness NEURO: CNII-XII grossly intact, 5/5 strength, full sensation all extremities, gait intact SKIN: Skin tone normal in color, warm and dry. No evidence of rash.

## 2017-07-11 LAB
HCT VFR BLD CALC: 52 % — HIGH (ref 39–50)
HGB BLD-MCNC: 17.1 G/DL — HIGH (ref 13–17)
MCHC RBC-ENTMCNC: 28.6 PG — SIGNIFICANT CHANGE UP (ref 27–34)
MCHC RBC-ENTMCNC: 32.9 GM/DL — SIGNIFICANT CHANGE UP (ref 32–36)
MCV RBC AUTO: 87.1 FL — SIGNIFICANT CHANGE UP (ref 80–100)
PLATELET # BLD AUTO: 264 K/UL — SIGNIFICANT CHANGE UP (ref 150–400)
RBC # BLD: 5.97 M/UL — HIGH (ref 4.2–5.8)
RBC # FLD: 12.8 % — SIGNIFICANT CHANGE UP (ref 10.3–14.5)
WBC # BLD: 6.69 K/UL — SIGNIFICANT CHANGE UP (ref 3.8–10.5)
WBC # FLD AUTO: 6.69 K/UL — SIGNIFICANT CHANGE UP (ref 3.8–10.5)

## 2017-07-11 PROCEDURE — 93306 TTE W/DOPPLER COMPLETE: CPT | Mod: 26

## 2017-07-11 PROCEDURE — 99232 SBSQ HOSP IP/OBS MODERATE 35: CPT | Mod: GC

## 2017-07-11 PROCEDURE — 99233 SBSQ HOSP IP/OBS HIGH 50: CPT | Mod: GC

## 2017-07-11 RX ORDER — LISINOPRIL 2.5 MG/1
2.5 TABLET ORAL DAILY
Qty: 0 | Refills: 0 | Status: DISCONTINUED | OUTPATIENT
Start: 2017-07-11 | End: 2017-07-13

## 2017-07-11 RX ORDER — PANTOPRAZOLE SODIUM 20 MG/1
40 TABLET, DELAYED RELEASE ORAL
Qty: 0 | Refills: 0 | Status: DISCONTINUED | OUTPATIENT
Start: 2017-07-11 | End: 2017-07-13

## 2017-07-11 RX ADMIN — HEPARIN SODIUM 5000 UNIT(S): 5000 INJECTION INTRAVENOUS; SUBCUTANEOUS at 20:49

## 2017-07-11 RX ADMIN — LISINOPRIL 2.5 MILLIGRAM(S): 2.5 TABLET ORAL at 12:17

## 2017-07-11 RX ADMIN — Medication 325 MILLIGRAM(S): at 11:37

## 2017-07-11 RX ADMIN — Medication 1 DROP(S): at 21:40

## 2017-07-11 RX ADMIN — HEPARIN SODIUM 5000 UNIT(S): 5000 INJECTION INTRAVENOUS; SUBCUTANEOUS at 14:09

## 2017-07-11 RX ADMIN — ATORVASTATIN CALCIUM 80 MILLIGRAM(S): 80 TABLET, FILM COATED ORAL at 20:49

## 2017-07-11 RX ADMIN — PANTOPRAZOLE SODIUM 40 MILLIGRAM(S): 20 TABLET, DELAYED RELEASE ORAL at 11:39

## 2017-07-11 RX ADMIN — HEPARIN SODIUM 5000 UNIT(S): 5000 INJECTION INTRAVENOUS; SUBCUTANEOUS at 05:23

## 2017-07-11 RX ADMIN — CLOPIDOGREL BISULFATE 75 MILLIGRAM(S): 75 TABLET, FILM COATED ORAL at 11:37

## 2017-07-11 NOTE — PROGRESS NOTE ADULT - SUBJECTIVE AND OBJECTIVE BOX
Patient is a 51y old  Male who presents with a chief complaint of chest pressure and dizziness with walking (10 Jul 2017 13:02)        SUBJECTIVE / OVERNIGHT EVENTS: Pt reports feeling tinges of chest pain radiating to his back for a few seconds at night associated with mild nausea. He also endorses feeling anxious about having another heart attack. Otherwise, he slept well, has been urinating normally and tolerating PO.      MEDICATIONS  (STANDING):  clopidogrel Tablet 75 milliGRAM(s) Oral daily  atorvastatin 80 milliGRAM(s) Oral at bedtime  aspirin 325 milliGRAM(s) Oral daily  heparin  Injectable 5000 Unit(s) SubCutaneous every 8 hours    MEDICATIONS  (PRN):      Vital Signs Last 24 Hrs  T(C): 36.4 (07-11-17 @ 04:36), Max: 37.3 (07-10-17 @ 15:22)  HR: 69 (07-11-17 @ 04:36) (64 - 82)  BP: 108/80 (07-11-17 @ 04:36) (108/80 - 146/90)  RR: 16 (07-11-17 @ 04:36) (16 - 20)  SpO2: 100% (07-11-17 @ 04:36) (97% - 100%)  CAPILLARY BLOOD GLUCOSE        I&O's Summary    10 Jul 2017 07:01  -  11 Jul 2017 07:00  --------------------------------------------------------  IN: 240 mL / OUT: 0 mL / NET: 240 mL        PHYSICAL EXAM  GENERAL: NAD, well-developed, comfortably sitting upright in bed  EYES: PERRLA, conjunctiva and sclera clear  NECK: Supple, No JVD  CHEST/LUNG: Clear to auscultation bilaterally; No wheeze or crackles  HEART: Regular rate and rhythm; No murmurs, rubs, or gallops  ABDOMEN: Soft, Nontender, Nondistended; Bowel sounds present  EXTREMITIES:  2+ Peripheral Pulses, No clubbing, cyanosis, or edema  PSYCH: AAOx3, moving all extremities, no focal deficits  SKIN: No rashes or lesions    LABS:                        18.0   6.6   )-----------( 220      ( 10 Jul 2017 09:29 )             53.3     07-10    138  |  100  |  17  ----------------------------<  112<H>  4.9   |  25  |  0.99    Ca    9.3      10 Jul 2017 01:30    TPro  7.6  /  Alb  4.5  /  TBili  0.5  /  DBili  x   /  AST  33  /  ALT  69<H>  /  AlkPhos  45  07-10    PT/INR - ( 10 Jul 2017 01:30 )   PT: 13.6 sec;   INR: 1.25 ratio         PTT - ( 10 Jul 2017 09:29 )  PTT:> 200 sec  CARDIAC MARKERS ( 10 Jul 2017 19:22 )  x     / <0.01 ng/mL / 87 U/L / x     / 2.2 ng/mL  CARDIAC MARKERS ( 10 Jul 2017 11:10 )  x     / <0.01 ng/mL / 82 U/L / x     / 2.4 ng/mL  CARDIAC MARKERS ( 10 Jul 2017 01:30 )  x     / <0.01 ng/mL / 106 U/L / x     / 2.2 ng/mL          RADIOLOGY & ADDITIONAL TESTS:    Imaging Personally Reviewed:  Consultant(s) Notes Reviewed:    Care Discussed with Consultants/Other Providers:

## 2017-07-11 NOTE — PROGRESS NOTE ADULT - ASSESSMENT
52 yo M with PMH of acute MI (2 weeks ago medically managed in [place]), HTN, HLD and GERD who is transferred from outside hospital with increased trops and EKG changes and chest pain with exertion.

## 2017-07-11 NOTE — PROGRESS NOTE ADULT - SUBJECTIVE AND OBJECTIVE BOX
24H hour events:   no events overnight  still with atypical sharp substernal chest pain only at rest   has been oob and walking to bathroom without sisues  still pending TTE- on schedule as discussed with TTE lab  trops have been negative          MEDICATIONS:  clopidogrel Tablet 75 milliGRAM(s) Oral daily  heparin  Injectable 5000 Unit(s) SubCutaneous every 8 hours        aspirin 325 milliGRAM(s) Oral daily    pantoprazole    Tablet 40 milliGRAM(s) Oral before breakfast    atorvastatin 80 milliGRAM(s) Oral at bedtime        REVIEW OF SYSTEMS:  General: no fatigue/malaise, weight loss/gain.  Skin: no rashes.  Ophthalmologic: no blurred vision, no loss of vision. 	  ENT: no sore throat, rhinorrhea, sinus congestion.  Respiratory: no SOB, cough or wheeze.  Gastrointestinal:  no N/V/D, no melena/hematemesis/hematochezia.  Genitourinary: no dysuria/hesitancy or hematuria.  Musculoskeletal: no myalgias or arthralgias.  Neurological: no changes in vision or hearing, no lightheadedness/dizziness, no syncope/near syncope	  Psychiatric: no unusual stress/anxiety.   Hematology/Lymphatics: no unusual bleeding, bruising and no lymphadenopathy.  Endocrine: no unusual thirst.   All others negative except as stated above and in HPI.    PHYSICAL EXAM:  T(C): 36.4 (07-11-17 @ 04:36), Max: 37.3 (07-10-17 @ 15:22)  HR: 69 (07-11-17 @ 04:36) (69 - 82)  BP: 108/80 (07-11-17 @ 04:36) (108/80 - 146/90)  RR: 16 (07-11-17 @ 04:36) (16 - 20)  SpO2: 100% (07-11-17 @ 04:36) (97% - 100%)  Wt(kg): --  I&O's Summary    10 Jul 2017 07:01  -  11 Jul 2017 07:00  --------------------------------------------------------  IN: 240 mL / OUT: 0 mL / NET: 240 mL        Appearance: Normal	  HEENT:   Normal oral mucosa, PERRL, EOMI	  Lymphatic: No lymphadenopathy  Cardiovascular: Normal S1 S2, No JVD, No murmurs, No edema  Respiratory: Lungs clear to auscultation	  Psychiatry: A & O x 3, Mood & affect appropriate  Gastrointestinal:  Soft, Non-tender, + BS	  Skin: No rashes, No ecchymoses, No cyanosis	  Neurologic: Non-focal  Extremities: Normal range of motion, No clubbing, cyanosis or edema  Vascular: Peripheral pulses palpable 2+ bilaterally        LABS:	 	    CBC Full  -  ( 11 Jul 2017 08:09 )  WBC Count : 6.69 K/uL  Hemoglobin : 17.1 g/dL  Hematocrit : 52.0 %  Platelet Count - Automated : 264 K/uL  Mean Cell Volume : 87.1 fl  Mean Cell Hemoglobin : 28.6 pg  Mean Cell Hemoglobin Concentration : 32.9 gm/dL  Auto Neutrophil # : x  Auto Lymphocyte # : x  Auto Monocyte # : x  Auto Eosinophil # : x  Auto Basophil # : x  Auto Neutrophil % : x  Auto Lymphocyte % : x  Auto Monocyte % : x  Auto Eosinophil % : x  Auto Basophil % : x    07-10    138  |  100  |  17  ----------------------------<  112<H>  4.9   |  25  |  0.99    Ca    9.3      10 Jul 2017 01:30    TPro  7.6  /  Alb  4.5  /  TBili  0.5  /  DBili  x   /  AST  33  /  ALT  69<H>  /  AlkPhos  45  07-10      proBNP: Serum Pro-Brain Natriuretic Peptide: 222 pg/mL (07-10-17 @ 01:30)  	  ASSESSMENT/PLAN: 	  50 yo M hx gastritis, HTN, HLD, recent IWMI 6/29/17, medically mangaged in [place] w/ IV metalyse, SL nitro, DAPT crestor  who represented with atypical chest pain.  - chest pain only at rest, does not appear to be angina, possibly post MI pericarditis though no rub heard on exam and no diffuse CASSIE. Currently without CP, will cont to monitor  - fu with TTE today to evaluate RV function. No intervention at this time as late presentation and trops remain negative.  - cont asa 81, plavix 75, atorvastatin 80  - would start lisinopril 2.5mg qd today  - be cautious re: SL nitro or any preload reducing agents until TTE returns though appears euvolemic, no JVD, reassuring of no RV failure.   - check a1c    Zehra Alegre MD [id number]  [facility] 24H hour events:   no events overnight  still with atypical sharp substernal chest pain only at rest   has been oob and walking to bathroom without sisues  still pending TTE- on schedule as discussed with TTE lab  trops have been negative      MEDICATIONS:  clopidogrel Tablet 75 milliGRAM(s) Oral daily  heparin  Injectable 5000 Unit(s) SubCutaneous every 8 hours    aspirin 325 milliGRAM(s) Oral daily    pantoprazole    Tablet 40 milliGRAM(s) Oral before breakfast    atorvastatin 80 milliGRAM(s) Oral at bedtime        REVIEW OF SYSTEMS:  General: no fatigue/malaise, weight loss/gain.  Skin: no rashes.  Ophthalmologic: no blurred vision, no loss of vision. 	  ENT: no sore throat, rhinorrhea, sinus congestion.  Respiratory: no SOB, cough or wheeze.  Gastrointestinal:  no N/V/D, no melena/hematemesis/hematochezia.  Genitourinary: no dysuria/hesitancy or hematuria.  Musculoskeletal: no myalgias or arthralgias.  Neurological: no changes in vision or hearing, no lightheadedness/dizziness, no syncope/near syncope	  Psychiatric: no unusual stress/anxiety.   Hematology/Lymphatics: no unusual bleeding, bruising and no lymphadenopathy.  Endocrine: no unusual thirst.   All others negative except as stated above and in HPI.    PHYSICAL EXAM:  T(C): 36.4 (07-11-17 @ 04:36), Max: 37.3 (07-10-17 @ 15:22)  HR: 69 (07-11-17 @ 04:36) (69 - 82)  BP: 108/80 (07-11-17 @ 04:36) (108/80 - 146/90)  RR: 16 (07-11-17 @ 04:36) (16 - 20)  SpO2: 100% (07-11-17 @ 04:36) (97% - 100%)  Wt(kg): --  I&O's Summary    10 Jul 2017 07:01  -  11 Jul 2017 07:00  --------------------------------------------------------  IN: 240 mL / OUT: 0 mL / NET: 240 mL        Appearance: Normal	  HEENT:   Normal oral mucosa, PERRL, EOMI	  Lymphatic: No lymphadenopathy  Cardiovascular: Normal S1 S2, No JVD, No murmurs, No edema  Respiratory: Lungs clear to auscultation	  Psychiatry: A & O x 3, Mood & affect appropriate  Gastrointestinal:  Soft, Non-tender, + BS	  Skin: No rashes, No ecchymoses, No cyanosis	  Neurologic: Non-focal  Extremities: Normal range of motion, No clubbing, cyanosis or edema  Vascular: Peripheral pulses palpable 2+ bilaterally        LABS:	 	    CBC Full  -  ( 11 Jul 2017 08:09 )  WBC Count : 6.69 K/uL  Hemoglobin : 17.1 g/dL  Hematocrit : 52.0 %  Platelet Count - Automated : 264 K/uL  Mean Cell Volume : 87.1 fl  Mean Cell Hemoglobin : 28.6 pg  Mean Cell Hemoglobin Concentration : 32.9 gm/dL  Auto Neutrophil # : x  Auto Lymphocyte # : x  Auto Monocyte # : x  Auto Eosinophil # : x  Auto Basophil # : x  Auto Neutrophil % : x  Auto Lymphocyte % : x  Auto Monocyte % : x  Auto Eosinophil % : x  Auto Basophil % : x      07-10    138  |  100  |  17  ----------------------------<  112<H>  4.9   |  25  |  0.99    Ca    9.3      10 Jul 2017 01:30    TPro  7.6  /  Alb  4.5  /  TBili  0.5  /  DBili  x   /  AST  33  /  ALT  69<H>  /  AlkPhos  45  07-10      proBNP: Serum Pro-Brain Natriuretic Peptide: 222 pg/mL (07-10-17 @ 01:30)  	  ASSESSMENT/PLAN: 	  50 yo M hx gastritis, HTN, HLD, recent IWMI 6/29/17, medically managed in [place] w/ IV metalyse, SL nitro, DAPT crestor  who represented with atypical chest pain.  - chest pain only at rest, does not appear to be angina, possibly post MI pericarditis though no rub heard on exam and no diffuse CASSIE. Currently without CP, will cont to monitor  - fu with TTE today to evaluate LV function. No intervention at this time as late presentation and troponin remains negative.  - cont asa 81, plavix 75, atorvastatin 80  - would start lisinopril 2.5mg qd today  - be cautious re: SL nitro or any preload reducing agents until TTE returns though appears euvolemic, no JVD, reassuring of no RV failure.   - check a1c    Zehra Alegre MD [id number]  [facility]

## 2017-07-11 NOTE — PROGRESS NOTE ADULT - PROBLEM SELECTOR PLAN 1
- increased trops and EKG changes at [facility] and loaded with plavix 600mg and asa 325mg   - Upon presentation here, EKG NSR without CASSIE or TWI, negative cardiac enzymes, stable VSS  - Cardiology following and rec's inpatient echo and outpatient follow up for stress test  - Continue plavix 75, asa 81 and atorvastatin 80mg  - Repeat cardiac enzymes downtrending

## 2017-07-12 LAB
ANION GAP SERPL CALC-SCNC: 14 MMOL/L — SIGNIFICANT CHANGE UP (ref 5–17)
BUN SERPL-MCNC: 14 MG/DL — SIGNIFICANT CHANGE UP (ref 7–23)
CALCIUM SERPL-MCNC: 9.4 MG/DL — SIGNIFICANT CHANGE UP (ref 8.4–10.5)
CHLORIDE SERPL-SCNC: 100 MMOL/L — SIGNIFICANT CHANGE UP (ref 96–108)
CO2 SERPL-SCNC: 22 MMOL/L — SIGNIFICANT CHANGE UP (ref 22–31)
CREAT SERPL-MCNC: 0.84 MG/DL — SIGNIFICANT CHANGE UP (ref 0.5–1.3)
GLUCOSE SERPL-MCNC: 98 MG/DL — SIGNIFICANT CHANGE UP (ref 70–99)
POTASSIUM SERPL-MCNC: 4.7 MMOL/L — SIGNIFICANT CHANGE UP (ref 3.5–5.3)
POTASSIUM SERPL-SCNC: 4.7 MMOL/L — SIGNIFICANT CHANGE UP (ref 3.5–5.3)
SODIUM SERPL-SCNC: 136 MMOL/L — SIGNIFICANT CHANGE UP (ref 135–145)

## 2017-07-12 PROCEDURE — 93454 CORONARY ARTERY ANGIO S&I: CPT | Mod: 26,59

## 2017-07-12 PROCEDURE — 99233 SBSQ HOSP IP/OBS HIGH 50: CPT | Mod: GC

## 2017-07-12 PROCEDURE — 92941 PRQ TRLML REVSC TOT OCCL AMI: CPT | Mod: RC

## 2017-07-12 PROCEDURE — 99232 SBSQ HOSP IP/OBS MODERATE 35: CPT | Mod: GC

## 2017-07-12 PROCEDURE — 93010 ELECTROCARDIOGRAM REPORT: CPT

## 2017-07-12 RX ORDER — METOPROLOL TARTRATE 50 MG
25 TABLET ORAL DAILY
Qty: 0 | Refills: 0 | Status: DISCONTINUED | OUTPATIENT
Start: 2017-07-12 | End: 2017-07-13

## 2017-07-12 RX ADMIN — PANTOPRAZOLE SODIUM 40 MILLIGRAM(S): 20 TABLET, DELAYED RELEASE ORAL at 05:34

## 2017-07-12 RX ADMIN — ATORVASTATIN CALCIUM 80 MILLIGRAM(S): 80 TABLET, FILM COATED ORAL at 21:05

## 2017-07-12 RX ADMIN — LISINOPRIL 2.5 MILLIGRAM(S): 2.5 TABLET ORAL at 05:34

## 2017-07-12 RX ADMIN — HEPARIN SODIUM 5000 UNIT(S): 5000 INJECTION INTRAVENOUS; SUBCUTANEOUS at 05:34

## 2017-07-12 RX ADMIN — Medication 325 MILLIGRAM(S): at 09:47

## 2017-07-12 RX ADMIN — Medication 1 DROP(S): at 21:05

## 2017-07-12 RX ADMIN — CLOPIDOGREL BISULFATE 75 MILLIGRAM(S): 75 TABLET, FILM COATED ORAL at 09:47

## 2017-07-12 RX ADMIN — Medication 25 MILLIGRAM(S): at 18:34

## 2017-07-12 NOTE — PROGRESS NOTE ADULT - PROBLEM SELECTOR PLAN 1
- improved  - Cardiology following and rec's outpatient follow up for stress test  - Continue plavix 75, asa 81 and atorvastatin 80mg  - TTE 7/11/17 without abnormalities (EF 68%) - improved  - Cardiology following and will be taking patient for cath today   - Continue plavix 75, asa 81 and atorvastatin 80mg  - TTE 7/11/17 without abnormalities (EF 68%)

## 2017-07-12 NOTE — DISCHARGE NOTE ADULT - COMMUNITY RESOURCES
[facility] handout provided, [facility] Coupon provided for future use, Medicaid application pending with our Finance office ([phone]).   ***FOLLOW UP APPOINTMENT: You have an appointment scheduled for follow up at the Cardiology clinic (free of charge): 7/19/17 at 1pm. Please arrive 15 minutes early at [address] [place] [address], [address] Floor, [address] (Room says "Cardiology Department").

## 2017-07-12 NOTE — DISCHARGE NOTE ADULT - MEDICATION SUMMARY - MEDICATIONS TO STOP TAKING
I will STOP taking the medications listed below when I get home from the hospital:    Crestor 20 mg oral tablet  -- 1 tab(s) by mouth once a day (at bedtime)    Brilinta  -- 1 tab(s) by mouth 2 times a day

## 2017-07-12 NOTE — DISCHARGE NOTE ADULT - ADDITIONAL INSTRUCTIONS
Patient stable for discharge and to follow up with cardiology in one week Patient stable for discharge and to follow up with cardiology in one week. You have an appointment for 7/19/2017 at one PM

## 2017-07-12 NOTE — PROGRESS NOTE ADULT - SUBJECTIVE AND OBJECTIVE BOX
Patient is a 51y old  Male who presents with a chief complaint of chest pressure and dizziness with walking (10 Jul 2017 13:02)        SUBJECTIVE / OVERNIGHT EVENTS: No overnight events. Patient without chest pain. Eating well and having normal bowel movements.       MEDICATIONS  (STANDING):  clopidogrel Tablet 75 milliGRAM(s) Oral daily  atorvastatin 80 milliGRAM(s) Oral at bedtime  aspirin 325 milliGRAM(s) Oral daily  heparin  Injectable 5000 Unit(s) SubCutaneous every 8 hours  pantoprazole    Tablet 40 milliGRAM(s) Oral before breakfast  lisinopril 2.5 milliGRAM(s) Oral daily  artificial  tears Solution 1 Drop(s) Both EYES three times a day    MEDICATIONS  (PRN):      Vital Signs Last 24 Hrs  T(C): 36.3 (07-12-17 @ 05:15), Max: 36.9 (07-11-17 @ 13:02)  HR: 71 (07-12-17 @ 05:15) (71 - 89)  BP: 108/77 (07-12-17 @ 05:15) (108/74 - 132/92)  RR: 16 (07-12-17 @ 05:15) (16 - 18)  SpO2: 98% (07-12-17 @ 05:15) (98% - 99%)  CAPILLARY BLOOD GLUCOSE        I&O's Summary    11 Jul 2017 07:01  -  12 Jul 2017 07:00  --------------------------------------------------------  IN: 480 mL / OUT: 0 mL / NET: 480 mL        PHYSICAL EXAM  GENERAL: NAD, sitting comfortably in bed, ambulating well  EYES: conjunctiva and sclera clear  NECK: Supple, No JVD, no thyromegaly  CHEST/LUNG: Clear to auscultation bilaterally; No wheeze or crackles; good air movement  HEART: Regular rate and rhythm; No murmurs, rubs, or gallops  ABDOMEN: Soft, Nontender, Nondistended; Bowel sounds present  EXTREMITIES:  2+ Peripheral Pulses, No clubbing, cyanosis, or edema  NEURO: AAOx3, moving all extremities, no focal deficits  SKIN: No rashes or lesions    LABS:                        17.1   6.69  )-----------( 264      ( 11 Jul 2017 08:09 )             52.0           PTT - ( 10 Jul 2017 09:29 )  PTT:> 200 sec  CARDIAC MARKERS ( 10 Jul 2017 19:22 )  x     / <0.01 ng/mL / 87 U/L / x     / 2.2 ng/mL  CARDIAC MARKERS ( 10 Jul 2017 11:10 )  x     / <0.01 ng/mL / 82 U/L / x     / 2.4 ng/mL          RADIOLOGY & ADDITIONAL TESTS:  < from: Transthoracic Echocardiogram (07.11.17 @ 10:53) >  Dimensions:    Normal Values:  LA:     2.6    2.0 - 4.0 cm  Ao:     3.7    2.0 - 3.8 cm  SEPTUM: 1.0    0.6 - 1.2 cm  PWT:    0.9    0.6 - 1.1 cm  LVIDd:  4.5    3.0 - 5.6 cm  LVIDs:  2.8    1.8 - 4.0 cm  Derived variables:  LVMI: 84 g/m2  RWT: 0.40  Fractional short: 38 %  EF ([place]oltz): 68 %  ------------------------------------------------------------------------  Observations:  Mitral Valve: Normal mitral valve. Mild mitral  regurgitation.  Aortic Valve/Aorta: Normal trileaflet aortic valve.  Aortic Root: 3.7 cm.  Left Atrium: LA volume index = 12 cc/m2.  Left Ventricle: Endocardium not well visualized; grossly  normal left ventricular systolic function. Normal left  ventricular internal dimensions and wall thicknesses. Mild  diastolic dysfunction (Stage I).  Right Heart: Normal right atrium. Normal right ventricular  size and function. Normal tricuspid valve. Normal pulmonic  valve.  Pericardium/Pleura: Normal pericardium with no pericardial  effusion.  Hemodynamic: Estimated right atrial pressure is 8 mm Hg.  ------------------------------------------------------------------------  Conclusions:  1. Normal left ventricular internal dimensions and wall  thicknesses.  2. Endocardium not well visualized; grossly normal left  ventricular systolic function.  *** No previous Echo exam.    < end of copied text >

## 2017-07-12 NOTE — DISCHARGE NOTE ADULT - PROVIDER TOKENS
FREE:[LAST:[Cardiology Clinic],PHONE:[(   )    -],FAX:[(   )    -],ADDRESS:[7/19/17 at 1pm. Please arrive 15 minutes early at [address] [place] [address], [place], [place] [address], [address] Floor, [address]]]

## 2017-07-12 NOTE — DISCHARGE NOTE ADULT - PATIENT PORTAL LINK FT
“You can access the Follow[employer] Patient Portal, offered by [facility], by registering with the following website: [email or web address]”

## 2017-07-12 NOTE — DISCHARGE NOTE ADULT - PLAN OF CARE
Please follow up with cardiology and take medications as prescribed You had a blockage in your right coronary artery and two stents were placed. You will need to take all your medications as prescribed everyday. It is extremely important to not miss a single dose of aspirin or plavix to prevent your stents from closing. Please refrain from strenuous activities for 6 weeks. Follow up with cardiology in one week. Take meds as prescribed Monitor blood pressure and keep log to show cardiologist. Take atorvastatin as prescribed You had a blockage in your right coronary artery and two stents were placed. You will need to take all your medications as prescribed everyday. It is extremely important to not miss a single dose of aspirin or plavix to prevent your stents from closing. Please refrain from strenuous activities for 6 weeks. Follow up with cardiology in one week.  You have an appointment at 7/19/2017 at one PM

## 2017-07-12 NOTE — DISCHARGE NOTE ADULT - HOSPITAL COURSE
50yo M with PMH of acute MI, HTN, HLD, GERD who was transferred from [facility] with increased trops and EKG changes. Pt was transferred to [facility] on 7/10/17 for ACS rule out. Upon presentation here chest pain was improved, cardiac enzymes were downtrending and EKG showed no CASSIE or TWI. Patient was loaded with plavix and asa and put on heparin gtt at outside hospital. Pt had TTE performed on 7/11/17 with no evidence of abnormality and EF 68%. Left heart cath was performed on 7/12/17 50yo M with PMH of myocardial infarction, hypertension, hyperlipidemia, GERD who was transferred from [facility] with increased trops and EKG changes. Pt was loaded with plavix and aspirin and put on heparin drip before arriving to [facility] on 7/10/17 for further medical management. Upon presentation here chest pain was improved, cardiac enzymes were downtrending and EKG showed no CASSIE or TWI. Pt had TTE performed on 7/11/17 with no evidence of abnormality and EF 68%. Left heart cath was performed on 7/12/17 and 2 drug eluting stents were placed in the proximal and mid RCA. Patient was discharged on 7/13/17 with 1-week cardiology follow up.

## 2017-07-12 NOTE — PROGRESS NOTE ADULT - ASSESSMENT
50 yo M with PMH of acute MI (2 weeks ago medically managed in [place]), HTN, HLD and GERD who is transferred from outside hospital with increased trops and EKG changes and chest pain with exertion, now without chest pain and negative CE.

## 2017-07-12 NOTE — DISCHARGE NOTE ADULT - CARE PLAN
Principal Discharge DX:	CAD in native artery  Goal:	Please follow up with cardiology and take medications as prescribed  Instructions for follow-up, activity and diet:	You had a blockage in your right coronary artery and two stents were placed. You will need to take all your medications as prescribed everyday. It is extremely important to not miss a single dose of aspirin or plavix to prevent your stents from closing. Please refrain from strenuous activities for 6 weeks. Follow up with cardiology in one week.  Secondary Diagnosis:	Essential hypertension  Goal:	Take meds as prescribed  Instructions for follow-up, activity and diet:	Monitor blood pressure and keep log to show cardiologist.  Secondary Diagnosis:	Hyperlipidemia, unspecified hyperlipidemia type Principal Discharge DX:	CAD in native artery  Goal:	Please follow up with cardiology and take medications as prescribed  Instructions for follow-up, activity and diet:	You had a blockage in your right coronary artery and two stents were placed. You will need to take all your medications as prescribed everyday. It is extremely important to not miss a single dose of aspirin or plavix to prevent your stents from closing. Please refrain from strenuous activities for 6 weeks. Follow up with cardiology in one week.  Secondary Diagnosis:	Essential hypertension  Goal:	Take meds as prescribed  Instructions for follow-up, activity and diet:	Monitor blood pressure and keep log to show cardiologist.  Secondary Diagnosis:	Hyperlipidemia, unspecified hyperlipidemia type  Goal:	Take atorvastatin as prescribed Principal Discharge DX:	CAD in native artery  Goal:	Please follow up with cardiology and take medications as prescribed  Instructions for follow-up, activity and diet:	You had a blockage in your right coronary artery and two stents were placed. You will need to take all your medications as prescribed everyday. It is extremely important to not miss a single dose of aspirin or plavix to prevent your stents from closing. Please refrain from strenuous activities for 6 weeks. Follow up with cardiology in one week.  You have an appointment at 7/19/2017 at one PM  Secondary Diagnosis:	Essential hypertension  Goal:	Take meds as prescribed  Instructions for follow-up, activity and diet:	Take meds as prescribed  Secondary Diagnosis:	Hyperlipidemia, unspecified hyperlipidemia type  Goal:	Take atorvastatin as prescribed

## 2017-07-12 NOTE — DISCHARGE NOTE ADULT - SOCIAL WORKER'S NAME
Breann Patton, [facility] [phone] [email or web address]

## 2017-07-12 NOTE — PROGRESS NOTE ADULT - SUBJECTIVE AND OBJECTIVE BOX
24H hour events:   no events overnight,  TTE yesterday shows normal LV function, unable to clearly access RV function    tele: sinus 70-90     MEDICATIONS:  clopidogrel Tablet 75 milliGRAM(s) Oral daily  heparin  Injectable 5000 Unit(s) SubCutaneous every 8 hours  lisinopril 2.5 milliGRAM(s) Oral daily        aspirin 325 milliGRAM(s) Oral daily    pantoprazole    Tablet 40 milliGRAM(s) Oral before breakfast    atorvastatin 80 milliGRAM(s) Oral at bedtime    artificial  tears Solution 1 Drop(s) Both EYES three times a day      REVIEW OF SYSTEMS:  General: no fatigue/malaise, weight loss/gain.  Skin: no rashes.  Ophthalmologic: no blurred vision, no loss of vision. 	  ENT: no sore throat, rhinorrhea, sinus congestion.  Respiratory: no SOB, cough or wheeze.  Gastrointestinal:  no N/V/D, no melena/hematemesis/hematochezia.  Genitourinary: no dysuria/hesitancy or hematuria.  Musculoskeletal: no myalgias or arthralgias.  Neurological: no changes in vision or hearing, no lightheadedness/dizziness, no syncope/near syncope	  Psychiatric: no unusual stress/anxiety.   Hematology/Lymphatics: no unusual bleeding, bruising and no lymphadenopathy.  Endocrine: no unusual thirst.   All others negative except as stated above and in HPI.    PHYSICAL EXAM:  T(C): 36.3 (07-12-17 @ 05:15), Max: 36.9 (07-11-17 @ 13:02)  HR: 71 (07-12-17 @ 05:15) (71 - 89)  BP: 108/77 (07-12-17 @ 05:15) (108/74 - 132/92)  RR: 16 (07-12-17 @ 05:15) (16 - 18)  SpO2: 98% (07-12-17 @ 05:15) (98% - 99%)  Wt(kg): --  I&O's Summary    10 Jul 2017 07:01  -  11 Jul 2017 07:00  --------------------------------------------------------  IN: 240 mL / OUT: 0 mL / NET: 240 mL    11 Jul 2017 07:01  -  12 Jul 2017 06:56  --------------------------------------------------------  IN: 480 mL / OUT: 0 mL / NET: 480 mL        Appearance: Normal	  HEENT:   Normal oral mucosa, PERRL, EOMI	  Lymphatic: No lymphadenopathy  Cardiovascular: Normal S1 S2, No JVD, No murmurs, No edema  Respiratory: Lungs clear to auscultation	  Psychiatry: A & O x 3, Mood & affect appropriate  Gastrointestinal:  Soft, Non-tender, + BS	  Skin: No rashes, No ecchymoses, No cyanosis	  Neurologic: Non-focal  Extremities: Normal range of motion, No clubbing, cyanosis or edema  Vascular: Peripheral pulses palpable 2+ bilaterally        LABS:	 	    CBC Full  -  ( 11 Jul 2017 08:09 )  WBC Count : 6.69 K/uL  Hemoglobin : 17.1 g/dL  Hematocrit : 52.0 %  Platelet Count - Automated : 264 K/uL  Mean Cell Volume : 87.1 fl  Mean Cell Hemoglobin : 28.6 pg  Mean Cell Hemoglobin Concentration : 32.9 gm/dL  Auto Neutrophil # : x  Auto Lymphocyte # : x  Auto Monocyte # : x  Auto Eosinophil # : x  Auto Basophil # : x  Auto Neutrophil % : x  Auto Lymphocyte % : x  Auto Monocyte % : x  Auto Eosinophil % : x  Auto Basophil % : x            proBNP: Serum Pro-Brain Natriuretic Peptide: 222 pg/mL (07-10-17 @ 01:30)      PREVIOUS DIAGNOSTIC TESTING:    [ ] Echocardiogram:  < from: Transthoracic Echocardiogram (07.11.17 @ 10:53) >  LA:     2.6    2.0 - 4.0 cm  Ao:     3.7    2.0 - 3.8 cm  SEPTUM: 1.0    0.6 - 1.2 cm  PWT:    0.9    0.6 - 1.1 cm  LVIDd:  4.5    3.0 - 5.6 cm  LVIDs:  2.8    1.8 - 4.0 cm  Derived variables:  LVMI: 84 g/m2  RWT: 0.40  Fractional short: 38 %  EF (Warrenicholtz): 68 %  ------------------------------------------------------------------------  Observations:  Mitral Valve: Normal mitral valve. Mild mitral  regurgitation.  Aortic Valve/Aorta: Normal trileaflet aortic valve.  Aortic Root: 3.7 cm.  Left Atrium: LA volume index = 12 cc/m2.  Left Ventricle: Endocardium not well visualized; grossly  normal left ventricular systolic function. Normal left  ventricular internal dimensions and wall thicknesses. Mild  diastolic dysfunction (Stage I).  Right Heart: Normal right atrium. Normal right ventricular  size and function. Normal tricuspid valve. Normal pulmonic  valve.  Pericardium/Pleura: Normal pericardium with no pericardial  effusion.  Hemodynamic: Estimated right atrial pressure is 8 mm Hg.  ------------------------------------------------------------------------  Conclusions:  1. Normal left ventricular internal dimensions and wall  thicknesses.  2. Endocardium not well visualized; grossly normal left  ventricular systolic function.  *** No previous Echo exam.    < end of copied text >    [ ]  Catheterization:  [ ] Stress Test:  	  	  ASSESSMENT/PLAN: 	  50 yo M hx gastritis, HTN, HLD, recent IWMI 6/29/17, medically managed in [place] w/ IV metalyse, SL nitro, DAPT crestor  who represented with atypical chest pain.  - chest pain only at rest, does not appear to be angina, possibly post MI pericarditis though no rub heard on exam and no diffuse CASSIE. Currently without CP, will cont to monitor  -TTE shows nl biv function, thus, with good wall thickening, will thus coordinate for cath for today   - cont asa 81, plavix 75, atorvastatin 80  - would start lisinopril 2.5mg qd today  - start metoprolol succinate 25mg qd today  - cath today        Zehra Alegre MD [id number]  [facility]

## 2017-07-12 NOTE — DISCHARGE NOTE ADULT - MEDICATION SUMMARY - MEDICATIONS TO TAKE
I will START or STAY ON the medications listed below when I get home from the hospital:    Proton pump inhibitor  -- 1 tab(s) by mouth once a day  -- Indication: For GERD (gastroesophageal reflux disease)    aspirin 81 mg oral delayed release tablet  -- 1 tab(s) by mouth once a day  -- Indication: For CAD in native artery    lisinopril 2.5 mg oral tablet  -- 1 tab(s) by mouth once a day  -- Indication: For HTN (hypertension)    atorvastatin 80 mg oral tablet  -- 1 tab(s) by mouth once a day (at bedtime)  -- Indication: For CAD in native artery    clopidogrel 75 mg oral tablet  -- 1 tab(s) by mouth once a day  -- Indication: For CAD in native artery    metoprolol succinate 25 mg oral tablet, extended release  -- 1 tab(s) by mouth once a day  -- Indication: For CAD in native artery    Refresh ophthalmic solution  -- 1 drop(s) to each affected eye once a day, As Needed  -- Indication: For Need for prophylactic measure I will START or STAY ON the medications listed below when I get home from the hospital:    Proton pump inhibitor  -- 1 tab(s) by mouth once a day  -- Indication: For GERD (gastroesophageal reflux disease)    aspirin 81 mg oral delayed release tablet  -- 1 tab(s) by mouth once a day  -- Indication: For CAD in native artery    lisinopril 2.5 mg oral tablet  -- 1 tab(s) by mouth once a day  -- Indication: For HTN (hypertension)    atorvastatin 80 mg oral tablet  -- 1 tab(s) by mouth once a day (at bedtime)  -- Indication: For CAD in native artery    clopidogrel 75 mg oral tablet  -- 1 tab(s) by mouth once a day  -- Indication: For CAD in native artery    metoprolol succinate 25 mg oral tablet, extended release  -- 1 tab(s) by mouth once a day  -- Indication: For CAD in native artery    Refresh ophthalmic solution  -- 1 drop(s) to each affected eye once a day, As Needed  -- Indication: For Eye Drops

## 2017-07-12 NOTE — DISCHARGE NOTE ADULT - CARE PROVIDER_API CALL
Cardiology Clinic,   7/19/17 at 1pm. Please arrive 15 minutes early at [address] [place] [address], [place], [place] [address], [address] Floor, [address]  Phone: (   )    -  Fax: (   )    -

## 2017-07-13 VITALS — WEIGHT: 144.18 LBS

## 2017-07-13 PROBLEM — Z00.00 ENCOUNTER FOR PREVENTIVE HEALTH EXAMINATION: Status: ACTIVE | Noted: 2017-07-13

## 2017-07-13 LAB
ANION GAP SERPL CALC-SCNC: 14 MMOL/L — SIGNIFICANT CHANGE UP (ref 5–17)
BUN SERPL-MCNC: 15 MG/DL — SIGNIFICANT CHANGE UP (ref 7–23)
CALCIUM SERPL-MCNC: 9.7 MG/DL — SIGNIFICANT CHANGE UP (ref 8.4–10.5)
CHLORIDE SERPL-SCNC: 97 MMOL/L — SIGNIFICANT CHANGE UP (ref 96–108)
CO2 SERPL-SCNC: 26 MMOL/L — SIGNIFICANT CHANGE UP (ref 22–31)
CREAT SERPL-MCNC: 0.93 MG/DL — SIGNIFICANT CHANGE UP (ref 0.5–1.3)
GLUCOSE SERPL-MCNC: 90 MG/DL — SIGNIFICANT CHANGE UP (ref 70–99)
HCT VFR BLD CALC: 51.5 % — HIGH (ref 39–50)
HGB BLD-MCNC: 17.2 G/DL — HIGH (ref 13–17)
MAGNESIUM SERPL-MCNC: 2 MG/DL — SIGNIFICANT CHANGE UP (ref 1.6–2.6)
MCHC RBC-ENTMCNC: 29.3 PG — SIGNIFICANT CHANGE UP (ref 27–34)
MCHC RBC-ENTMCNC: 33.4 GM/DL — SIGNIFICANT CHANGE UP (ref 32–36)
MCV RBC AUTO: 87.7 FL — SIGNIFICANT CHANGE UP (ref 80–100)
PHOSPHATE SERPL-MCNC: 3.4 MG/DL — SIGNIFICANT CHANGE UP (ref 2.5–4.5)
PLATELET # BLD AUTO: 247 K/UL — SIGNIFICANT CHANGE UP (ref 150–400)
POTASSIUM SERPL-MCNC: 4.7 MMOL/L — SIGNIFICANT CHANGE UP (ref 3.5–5.3)
POTASSIUM SERPL-SCNC: 4.7 MMOL/L — SIGNIFICANT CHANGE UP (ref 3.5–5.3)
RBC # BLD: 5.87 M/UL — HIGH (ref 4.2–5.8)
RBC # FLD: 12.9 % — SIGNIFICANT CHANGE UP (ref 10.3–14.5)
SODIUM SERPL-SCNC: 137 MMOL/L — SIGNIFICANT CHANGE UP (ref 135–145)
WBC # BLD: 10.12 K/UL — SIGNIFICANT CHANGE UP (ref 3.8–10.5)
WBC # FLD AUTO: 10.12 K/UL — SIGNIFICANT CHANGE UP (ref 3.8–10.5)

## 2017-07-13 PROCEDURE — 93010 ELECTROCARDIOGRAM REPORT: CPT

## 2017-07-13 PROCEDURE — 99232 SBSQ HOSP IP/OBS MODERATE 35: CPT | Mod: GC

## 2017-07-13 PROCEDURE — 99239 HOSP IP/OBS DSCHRG MGMT >30: CPT

## 2017-07-13 RX ORDER — ASPIRIN/CALCIUM CARB/MAGNESIUM 324 MG
1 TABLET ORAL
Qty: 30 | Refills: 0 | OUTPATIENT
Start: 2017-07-13 | End: 2017-08-12

## 2017-07-13 RX ORDER — ASPIRIN/CALCIUM CARB/MAGNESIUM 324 MG
81 TABLET ORAL DAILY
Qty: 0 | Refills: 0 | Status: DISCONTINUED | OUTPATIENT
Start: 2017-07-13 | End: 2017-07-13

## 2017-07-13 RX ORDER — ATORVASTATIN CALCIUM 80 MG/1
1 TABLET, FILM COATED ORAL
Qty: 30 | Refills: 0 | OUTPATIENT
Start: 2017-07-13 | End: 2017-08-12

## 2017-07-13 RX ORDER — LISINOPRIL 2.5 MG/1
1 TABLET ORAL
Qty: 30 | Refills: 0 | OUTPATIENT
Start: 2017-07-13 | End: 2017-08-12

## 2017-07-13 RX ORDER — METOPROLOL TARTRATE 50 MG
1 TABLET ORAL
Qty: 30 | Refills: 0 | OUTPATIENT
Start: 2017-07-13 | End: 2017-08-12

## 2017-07-13 RX ORDER — CLOPIDOGREL BISULFATE 75 MG/1
1 TABLET, FILM COATED ORAL
Qty: 30 | Refills: 0 | OUTPATIENT
Start: 2017-07-13 | End: 2017-08-12

## 2017-07-13 RX ADMIN — Medication 1 DROP(S): at 05:39

## 2017-07-13 RX ADMIN — Medication 1 DROP(S): at 13:25

## 2017-07-13 RX ADMIN — HEPARIN SODIUM 5000 UNIT(S): 5000 INJECTION INTRAVENOUS; SUBCUTANEOUS at 05:39

## 2017-07-13 RX ADMIN — PANTOPRAZOLE SODIUM 40 MILLIGRAM(S): 20 TABLET, DELAYED RELEASE ORAL at 05:39

## 2017-07-13 RX ADMIN — CLOPIDOGREL BISULFATE 75 MILLIGRAM(S): 75 TABLET, FILM COATED ORAL at 12:54

## 2017-07-13 RX ADMIN — HEPARIN SODIUM 5000 UNIT(S): 5000 INJECTION INTRAVENOUS; SUBCUTANEOUS at 13:25

## 2017-07-13 RX ADMIN — LISINOPRIL 2.5 MILLIGRAM(S): 2.5 TABLET ORAL at 09:31

## 2017-07-13 RX ADMIN — Medication 25 MILLIGRAM(S): at 09:31

## 2017-07-13 RX ADMIN — Medication 81 MILLIGRAM(S): at 12:55

## 2017-07-13 NOTE — PROGRESS NOTE ADULT - SUBJECTIVE AND OBJECTIVE BOX
24H hour events:   sp OSWALDO to p/mRCA via RRA   no events    tele: sinus 80s    MEDICATIONS:  clopidogrel Tablet 75 milliGRAM(s) Oral daily  heparin  Injectable 5000 Unit(s) SubCutaneous every 8 hours  lisinopril 2.5 milliGRAM(s) Oral daily  metoprolol succinate ER 25 milliGRAM(s) Oral daily        aspirin 325 milliGRAM(s) Oral daily    pantoprazole    Tablet 40 milliGRAM(s) Oral before breakfast    atorvastatin 80 milliGRAM(s) Oral at bedtime    artificial  tears Solution 1 Drop(s) Both EYES three times a day      REVIEW OF SYSTEMS:  General: no fatigue/malaise, weight loss/gain.  Skin: no rashes.  Ophthalmologic: no blurred vision, no loss of vision. 	  ENT: no sore throat, rhinorrhea, sinus congestion.  Respiratory: no SOB, cough or wheeze.  Gastrointestinal:  no N/V/D, no melena/hematemesis/hematochezia.  Genitourinary: no dysuria/hesitancy or hematuria.  Musculoskeletal: no myalgias or arthralgias.  Neurological: no changes in vision or hearing, no lightheadedness/dizziness, no syncope/near syncope	  Psychiatric: no unusual stress/anxiety.   Hematology/Lymphatics: no unusual bleeding, bruising and no lymphadenopathy.  Endocrine: no unusual thirst.   All others negative except as stated above and in HPI.    PHYSICAL EXAM:  T(C): 36.7 (07-13-17 @ 04:32), Max: 36.7 (07-12-17 @ 20:59)  HR: 86 (07-13-17 @ 04:32) (64 - 93)  BP: 99/67 (07-13-17 @ 04:32) (99/67 - 124/91)  RR: 18 (07-13-17 @ 04:32) (16 - 18)  SpO2: 97% (07-13-17 @ 04:32) (95% - 98%)  Wt(kg): --  I&O's Summary    11 Jul 2017 07:01  -  12 Jul 2017 07:00  --------------------------------------------------------  IN: 480 mL / OUT: 0 mL / NET: 480 mL    12 Jul 2017 07:01  -  13 Jul 2017 06:55  --------------------------------------------------------  IN: 1060 mL / OUT: 0 mL / NET: 1060 mL        Appearance: Normal	  HEENT:   Normal oral mucosa, PERRL, EOMI	  Lymphatic: No lymphadenopathy  Cardiovascular: Normal S1 S2, No JVD, No murmurs, No edema  Respiratory: Lungs clear to auscultation	  Psychiatry: A & O x 3, Mood & affect appropriate  Gastrointestinal:  Soft, Non-tender, + BS	  Skin: No rashes, No ecchymoses, No cyanosis	  Neurologic: Non-focal  Extremities: Normal range of motion, No clubbing, cyanosis or edema  Vascular: Peripheral pulses palpable 2+ bilaterally        LABS:	 	    CBC Full  -  ( 11 Jul 2017 08:09 )  WBC Count : 6.69 K/uL  Hemoglobin : 17.1 g/dL  Hematocrit : 52.0 %  Platelet Count - Automated : 264 K/uL  Mean Cell Volume : 87.1 fl  Mean Cell Hemoglobin : 28.6 pg  Mean Cell Hemoglobin Concentration : 32.9 gm/dL  Auto Neutrophil # : x  Auto Lymphocyte # : x  Auto Monocyte # : x  Auto Eosinophil # : x  Auto Basophil # : x  Auto Neutrophil % : x  Auto Lymphocyte % : x  Auto Monocyte % : x  Auto Eosinophil % : x  Auto Basophil % : x    07-12    136  |  100  |  14  ----------------------------<  98  4.7   |  22  |  0.84    Ca    9.4      12 Jul 2017 07:18        proBNP: Serum Pro-Brain Natriuretic Peptide: 222 pg/mL (07-10-17 @ 01:30)    PREVIOUS DIAGNOSTIC TESTING:    [ ] Echocardiogram:  < from: Transthoracic Echocardiogram (07.11.17 @ 10:53) >  LA:     2.6    2.0 - 4.0 cm  Ao:     3.7    2.0 - 3.8 cm  SEPTUM: 1.0    0.6 - 1.2 cm  PWT:    0.9    0.6 - 1.1 cm  LVIDd:  4.5    3.0 - 5.6 cm  LVIDs:  2.8    1.8 - 4.0 cm  Derived variables:  LVMI: 84 g/m2  RWT: 0.40  Fractional short: 38 %  EF (Teicholtz): 68 %  ------------------------------------------------------------------------  Observations:  Mitral Valve: Normal mitral valve. Mild mitral  regurgitation.  Aortic Valve/Aorta: Normal trileaflet aortic valve.  Aortic Root: 3.7 cm.  Left Atrium: LA volume index = 12 cc/m2.  Left Ventricle: Endocardium not well visualized; grossly  normal left ventricular systolic function. Normal left  ventricular internal dimensions and wall thicknesses. Mild  diastolic dysfunction (Stage I).  Right Heart: Normal right atrium. Normal right ventricular  size and function. Normal tricuspid valve. Normal pulmonic  valve.  Pericardium/Pleura: Normal pericardium with no pericardial  effusion.  Hemodynamic: Estimated right atrial pressure is 8 mm Hg.  ------------------------------------------------------------------------  Conclusions:  1. Normal left ventricular internal dimensions and wall  thicknesses.  2. Endocardium not well visualized; grossly normal left  ventricular systolic function.  *** No previous Echo exam.    < end of copied text >    [ ]  Catheterization:  [ ] Stress Test:  	  	  ASSESSMENT/PLAN: 	  50 yo M hx gastritis, HTN, HLD, recent IWMI 6/29/17, medically managed in [place] w/ IV metalyse, SL nitro, DAPT crestor  who represented with atypical chest pain. sp cath showing 95% pRCA occlusion sp OSWALDO x2 to p/m RCA.  - derease asa to 81 please, not 325 and cont plavix 75, atorvastatin 80  - cont lisinopril 2.5mg qd today  - cont metoprolol succinate 25mg qd today    - dispo today, with follow up with cardiology in 1 week, has medicaid pending, can follow up at our fellows clinic here     Zehra Alegre MD [id number]  [facility] 24H hour events:   sp OSWALDO to p/mRCA via RRA   no events    tele: sinus 80s    MEDICATIONS:  clopidogrel Tablet 75 milliGRAM(s) Oral daily  heparin  Injectable 5000 Unit(s) SubCutaneous every 8 hours  lisinopril 2.5 milliGRAM(s) Oral daily  metoprolol succinate ER 25 milliGRAM(s) Oral daily    aspirin 325 milliGRAM(s) Oral daily    pantoprazole    Tablet 40 milliGRAM(s) Oral before breakfast    atorvastatin 80 milliGRAM(s) Oral at bedtime    artificial  tears Solution 1 Drop(s) Both EYES three times a day      REVIEW OF SYSTEMS:  General: no fatigue/malaise, weight loss/gain.  Skin: no rashes.  Ophthalmologic: no blurred vision, no loss of vision. 	  ENT: no sore throat, rhinorrhea, sinus congestion.  Respiratory: no SOB, cough or wheeze.  Gastrointestinal:  no N/V/D, no melena/hematemesis/hematochezia.  Genitourinary: no dysuria/hesitancy or hematuria.  Musculoskeletal: no myalgias or arthralgias.  Neurological: no changes in vision or hearing, no lightheadedness/dizziness, no syncope/near syncope	  Psychiatric: no unusual stress/anxiety.   Hematology/Lymphatics: no unusual bleeding, bruising and no lymphadenopathy.  Endocrine: no unusual thirst.   All others negative except as stated above and in HPI.    PHYSICAL EXAM:  T(C): 36.7 (07-13-17 @ 04:32), Max: 36.7 (07-12-17 @ 20:59)  HR: 86 (07-13-17 @ 04:32) (64 - 93)  BP: 99/67 (07-13-17 @ 04:32) (99/67 - 124/91)  RR: 18 (07-13-17 @ 04:32) (16 - 18)  SpO2: 97% (07-13-17 @ 04:32) (95% - 98%)  Wt(kg): --  I&O's Summary    11 Jul 2017 07:01  -  12 Jul 2017 07:00  --------------------------------------------------------  IN: 480 mL / OUT: 0 mL / NET: 480 mL    12 Jul 2017 07:01  -  13 Jul 2017 06:55  --------------------------------------------------------  IN: 1060 mL / OUT: 0 mL / NET: 1060 mL        Appearance: Normal	  HEENT:   Normal oral mucosa, PERRL, EOMI	  Lymphatic: No lymphadenopathy  Cardiovascular: Normal S1 S2, No JVD, No murmurs, No edema  Respiratory: Lungs clear to auscultation	  Psychiatry: A & O x 3, Mood & affect appropriate  Gastrointestinal:  Soft, Non-tender, + BS	  Skin: No rashes, No ecchymoses, No cyanosis	  Neurologic: Non-focal  Extremities: Normal range of motion, No clubbing, cyanosis or edema  Vascular: Peripheral pulses palpable 2+ bilaterally        LABS:	 	    CBC Full  -  ( 11 Jul 2017 08:09 )  WBC Count : 6.69 K/uL  Hemoglobin : 17.1 g/dL  Hematocrit : 52.0 %  Platelet Count - Automated : 264 K/uL  Mean Cell Volume : 87.1 fl  Mean Cell Hemoglobin : 28.6 pg  Mean Cell Hemoglobin Concentration : 32.9 gm/dL  Auto Neutrophil # : x  Auto Lymphocyte # : x  Auto Monocyte # : x  Auto Eosinophil # : x  Auto Basophil # : x  Auto Neutrophil % : x  Auto Lymphocyte % : x  Auto Monocyte % : x  Auto Eosinophil % : x  Auto Basophil % : x    07-12    136  |  100  |  14  ----------------------------<  98  4.7   |  22  |  0.84    Ca    9.4      12 Jul 2017 07:18        proBNP: Serum Pro-Brain Natriuretic Peptide: 222 pg/mL (07-10-17 @ 01:30)    PREVIOUS DIAGNOSTIC TESTING:    [ ] Echocardiogram:  < from: Transthoracic Echocardiogram (07.11.17 @ 10:53) >  LA:     2.6    2.0 - 4.0 cm  Ao:     3.7    2.0 - 3.8 cm  SEPTUM: 1.0    0.6 - 1.2 cm  PWT:    0.9    0.6 - 1.1 cm  LVIDd:  4.5    3.0 - 5.6 cm  LVIDs:  2.8    1.8 - 4.0 cm  Derived variables:  LVMI: 84 g/m2  RWT: 0.40  Fractional short: 38 %  EF (Teicholtz): 68 %  ------------------------------------------------------------------------  Observations:  Mitral Valve: Normal mitral valve. Mild mitral  regurgitation.  Aortic Valve/Aorta: Normal trileaflet aortic valve.  Aortic Root: 3.7 cm.  Left Atrium: LA volume index = 12 cc/m2.  Left Ventricle: Endocardium not well visualized; grossly  normal left ventricular systolic function. Normal left  ventricular internal dimensions and wall thicknesses. Mild  diastolic dysfunction (Stage I).  Right Heart: Normal right atrium. Normal right ventricular  size and function. Normal tricuspid valve. Normal pulmonic  valve.  Pericardium/Pleura: Normal pericardium with no pericardial  effusion.  Hemodynamic: Estimated right atrial pressure is 8 mm Hg.  ------------------------------------------------------------------------  Conclusions:  1. Normal left ventricular internal dimensions and wall  thicknesses.  2. Endocardium not well visualized; grossly normal left  ventricular systolic function.  *** No previous Echo exam.    < end of copied text >    [ ]  Catheterization: severe mid-RCA stenosis, no obstructive disease left coronary system, stent placed to RCA with excellent result.    [ ] Stress Test:  	  	  ASSESSMENT/PLAN: 	  52 yo M hx gastritis, HTN, HLD, recent IWMI 6/29/17, medically managed in [place] w/ IV metalyse, SL nitro, DAPT crestor  who represented with atypical chest pain. sp cath showing 95% pRCA occlusion sp OSWALDO x2 to p/m RCA.  - decrease asa to 81 please, not 325 and cont plavix 75, atorvastatin 80  - cont lisinopril 2.5mg qd today  - cont metoprolol succinate 25mg qd today  - continue high dose statin    - dispo today, with follow up with cardiology in 1 week, has medicaid pending, can follow up at our [facility] clinic here     Zehra Alegre MD [id number]  [facility]

## 2017-07-13 NOTE — PROGRESS NOTE ADULT - ASSESSMENT
52 yo M with PMH of acute MI (2 weeks ago medically managed in [place]), HTN, HLD and GERD who is transferred from outside hospital with increased trops and EKG changes and chest pain with exertion, now without chest pain and negative CE, s/p left heart cath.

## 2017-07-13 NOTE — PROGRESS NOTE ADULT - ATTENDING COMMENTS
Acute inferior wall MI in [place], has now undergone coronary angiography confirming severe RCA lesion, but not total occlusion thus stent placed. No other disease. Discharge home on high intensity statin, aspirin, clopidogrel, beta blocker and ACE-i. Arrange follow-up in Cardiology Clinic.
Coronary angiography today, isolated mid-RCA 95% stenosis and since not occluded had placement of stent. Patent vessel restored. No occlusive LAD disease. Observe overnight and anticipate discharge tomorrow.
Presents greater than a week after MI. No post-infarct angina, normal cardiac enzymes. Need cardiac echo then determine merits of coronary angiography.
Echo shows no LV dysfunction. Troponins have been negative. Patient reports dizziness when standing up from bed, and some slight twinges of chest pain when ambulating. Will check orthostatic vitals. Will follow up with cardiology. Likely no intervention.
Patient had no events overnight. Cath site without hematoma, both upper extremities without any sensory or motor deficits, no swelling. Can d/c home today, f/u with cardiology as outpatient.
Patient is going for cath today, concern for ischemic disease despite normal echo (given recent hospitalization and medical management in [place] for the same). Follow up results after procedure. Remained chest pain free overnight.

## 2017-07-13 NOTE — PROGRESS NOTE ADULT - PROBLEM SELECTOR PLAN 1
- improved  - Cardiology following  - Continue plavix 75, asa 81 and atorvastatin 80mg  - TTE 7/11/17 without abnormalities (EF 68%)  - Day 1 post- left heart cath

## 2017-07-13 NOTE — PROGRESS NOTE ADULT - SUBJECTIVE AND OBJECTIVE BOX
Patient is a 51y old  Male who presents with a chief complaint of chest pressure and dizziness with walking (12 Jul 2017 17:35)        SUBJECTIVE / OVERNIGHT EVENTS: Pt went for [facility] yesterday. No post-op complications. Denies CP, SOB, N/V/D, tingling or numbness in hand, weakness.      MEDICATIONS  (STANDING):  clopidogrel Tablet 75 milliGRAM(s) Oral daily  atorvastatin 80 milliGRAM(s) Oral at bedtime  aspirin 325 milliGRAM(s) Oral daily  heparin  Injectable 5000 Unit(s) SubCutaneous every 8 hours  pantoprazole    Tablet 40 milliGRAM(s) Oral before breakfast  lisinopril 2.5 milliGRAM(s) Oral daily  artificial  tears Solution 1 Drop(s) Both EYES three times a day  metoprolol succinate ER 25 milliGRAM(s) Oral daily    MEDICATIONS  (PRN):      Vital Signs Last 24 Hrs  T(C): 36.7 (07-13-17 @ 04:32), Max: 36.7 (07-12-17 @ 20:59)  HR: 86 (07-13-17 @ 04:32) (64 - 93)  BP: 99/67 (07-13-17 @ 04:32) (99/67 - 124/91)  RR: 18 (07-13-17 @ 04:32) (16 - 18)  SpO2: 97% (07-13-17 @ 04:32) (95% - 98%)  CAPILLARY BLOOD GLUCOSE        I&O's Summary    12 Jul 2017 07:01  -  13 Jul 2017 07:00  --------------------------------------------------------  IN: 1060 mL / OUT: 0 mL / NET: 1060 mL        PHYSICAL EXAM  GENERAL: NAD, well-developed, ambulating well  EYES: Conjunctiva and sclera clear  CHEST/LUNG: Clear to auscultation bilaterally; No wheeze or crackles  HEART: Regular rate and rhythm; No murmurs, rubs, or gallops  ABDOMEN: Soft, Nontender, Nondistended; Bowel sounds present  EXTREMITIES:  Radial bandage c/d/i, 2+ radial pulse, no clubbing, cyanosis, or edema  NEURO: AAOx3, moving all extremities, no focal deficits  SKIN: No rashes or lesions    LABS:                        17.1   6.69  )-----------( 264      ( 11 Jul 2017 08:09 )             52.0     07-12    136  |  100  |  14  ----------------------------<  98  4.7   |  22  |  0.84    Ca    9.4      12 Jul 2017 07:18                RADIOLOGY & ADDITIONAL TESTS:  Patient is a 51y old  Male who presents with a chief complaint of chest pressure and dizziness with walking (12 Jul 2017 17:35)        SUBJECTIVE / OVERNIGHT EVENTS:      MEDICATIONS  (STANDING):  clopidogrel Tablet 75 milliGRAM(s) Oral daily  atorvastatin 80 milliGRAM(s) Oral at bedtime  aspirin 325 milliGRAM(s) Oral daily  heparin  Injectable 5000 Unit(s) SubCutaneous every 8 hours  pantoprazole    Tablet 40 milliGRAM(s) Oral before breakfast  lisinopril 2.5 milliGRAM(s) Oral daily  artificial  tears Solution 1 Drop(s) Both EYES three times a day  metoprolol succinate ER 25 milliGRAM(s) Oral daily    MEDICATIONS  (PRN):      Vital Signs Last 24 Hrs  T(C): 36.7 (07-13-17 @ 04:32), Max: 36.7 (07-12-17 @ 20:59)  HR: 86 (07-13-17 @ 04:32) (64 - 93)  BP: 99/67 (07-13-17 @ 04:32) (99/67 - 124/91)  RR: 18 (07-13-17 @ 04:32) (16 - 18)  SpO2: 97% (07-13-17 @ 04:32) (95% - 98%)  CAPILLARY BLOOD GLUCOSE        I&O's Summary    12 Jul 2017 07:01  -  13 Jul 2017 07:00  --------------------------------------------------------  IN: 1060 mL / OUT: 0 mL / NET: 1060 mL        PHYSICAL EXAM  GENERAL: NAD, well-developed  HEAD:  Atraumatic, Normocephalic  EYES: EOMI, PERRLA, conjunctiva and sclera clear  NECK: Supple, No JVD  CHEST/LUNG: Clear to auscultation bilaterally; No wheeze  HEART: Regular rate and rhythm; No murmurs, rubs, or gallops  ABDOMEN: Soft, Nontender, Nondistended; Bowel sounds present  EXTREMITIES:  2+ Peripheral Pulses, No clubbing, cyanosis, or edema  PSYCH: AAOx3  SKIN: No rashes or lesions    LABS:                        17.1   6.69  )-----------( 264      ( 11 Jul 2017 08:09 )             52.0     07-12    136  |  100  |  14  ----------------------------<  98  4.7   |  22  |  0.84    Ca    9.4      12 Jul 2017 07:18                RADIOLOGY & ADDITIONAL TESTS:

## 2017-07-19 ENCOUNTER — APPOINTMENT (OUTPATIENT)
Dept: CARDIOLOGY | Facility: HOSPITAL | Age: 52
End: 2017-07-19

## 2017-07-19 ENCOUNTER — NON-APPOINTMENT (OUTPATIENT)
Age: 52
End: 2017-07-19

## 2017-07-19 ENCOUNTER — OUTPATIENT (OUTPATIENT)
Dept: OUTPATIENT SERVICES | Facility: HOSPITAL | Age: 52
LOS: 1 days | End: 2017-07-19
Payer: MEDICAID

## 2017-07-19 VITALS
DIASTOLIC BLOOD PRESSURE: 80 MMHG | OXYGEN SATURATION: 97 % | SYSTOLIC BLOOD PRESSURE: 119 MMHG | HEART RATE: 73 BPM | HEIGHT: 63 IN | WEIGHT: 145 LBS | BODY MASS INDEX: 25.69 KG/M2

## 2017-07-19 DIAGNOSIS — Z87.891 PERSONAL HISTORY OF NICOTINE DEPENDENCE: ICD-10-CM

## 2017-07-19 DIAGNOSIS — R00.2 PALPITATIONS: ICD-10-CM

## 2017-07-19 DIAGNOSIS — K21.9 GASTRO-ESOPHAGEAL REFLUX DISEASE W/OUT ESOPHAGITIS: ICD-10-CM

## 2017-07-19 DIAGNOSIS — I25.10 ATHEROSCLEROTIC HEART DISEASE OF NATIVE CORONARY ARTERY WITHOUT ANGINA PECTORIS: ICD-10-CM

## 2017-07-19 DIAGNOSIS — E78.5 HYPERLIPIDEMIA, UNSPECIFIED: ICD-10-CM

## 2017-07-19 DIAGNOSIS — Z80.3 FAMILY HISTORY OF MALIGNANT NEOPLASM OF BREAST: ICD-10-CM

## 2017-07-19 DIAGNOSIS — I25.10 ATHEROSCLEROTIC HEART DISEASE OF NATIVE CORONARY ARTERY W/OUT ANGINA PECTORIS: ICD-10-CM

## 2017-07-19 DIAGNOSIS — Z82.49 FAMILY HISTORY OF ISCHEMIC HEART DISEASE AND OTHER DISEASES OF THE CIRCULATORY SYSTEM: ICD-10-CM

## 2017-07-19 DIAGNOSIS — Z78.9 OTHER SPECIFIED HEALTH STATUS: ICD-10-CM

## 2017-07-19 DIAGNOSIS — I10 ESSENTIAL (PRIMARY) HYPERTENSION: ICD-10-CM

## 2017-07-19 PROCEDURE — G0463: CPT

## 2017-07-19 PROCEDURE — 93005 ELECTROCARDIOGRAM TRACING: CPT

## 2017-07-19 RX ORDER — CLOPIDOGREL BISULFATE 75 MG/1
75 TABLET, FILM COATED ORAL
Refills: 0 | Status: ACTIVE | COMMUNITY

## 2017-07-19 RX ORDER — METOPROLOL SUCCINATE 25 MG/1
25 TABLET, EXTENDED RELEASE ORAL
Refills: 0 | Status: ACTIVE | COMMUNITY

## 2017-07-19 RX ORDER — ATORVASTATIN CALCIUM 80 MG/1
80 TABLET, FILM COATED ORAL
Refills: 0 | Status: ACTIVE | COMMUNITY

## 2017-07-19 RX ORDER — LISINOPRIL 30 MG/1
TABLET ORAL DAILY
Refills: 0 | Status: ACTIVE | COMMUNITY

## 2017-07-19 RX ORDER — ASPIRIN 81 MG/1
81 TABLET ORAL
Refills: 0 | Status: ACTIVE | COMMUNITY

## 2017-07-20 DIAGNOSIS — E78.5 HYPERLIPIDEMIA, UNSPECIFIED: ICD-10-CM

## 2017-07-20 DIAGNOSIS — R00.2 PALPITATIONS: ICD-10-CM

## 2017-07-20 DIAGNOSIS — K21.9 GASTRO-ESOPHAGEAL REFLUX DISEASE WITHOUT ESOPHAGITIS: ICD-10-CM

## 2017-07-27 ENCOUNTER — APPOINTMENT (OUTPATIENT)
Dept: HEMATOLOGY ONCOLOGY | Facility: CLINIC | Age: 52
End: 2017-07-27

## 2017-08-02 ENCOUNTER — APPOINTMENT (OUTPATIENT)
Dept: INTERNAL MEDICINE | Facility: CLINIC | Age: 52
End: 2017-08-02

## 2017-10-27 RX ORDER — TICAGRELOR 90 MG/1
1 TABLET ORAL
Qty: 0 | Refills: 0 | COMMUNITY

## 2017-10-27 RX ORDER — ROSUVASTATIN CALCIUM 5 MG/1
1 TABLET ORAL
Qty: 0 | Refills: 0 | COMMUNITY

## 2017-10-27 RX ORDER — ACETAMINOPHEN 500 MG
2 TABLET ORAL
Qty: 0 | Refills: 0 | COMMUNITY

## 2017-10-27 RX ORDER — ASPIRIN/CALCIUM CARB/MAGNESIUM 324 MG
1 TABLET ORAL
Qty: 0 | Refills: 0 | COMMUNITY

## 2019-02-08 PROBLEM — E78.5 HYPERLIPIDEMIA, UNSPECIFIED: Chronic | Status: ACTIVE | Noted: 2017-07-10

## 2019-02-08 PROBLEM — I10 ESSENTIAL (PRIMARY) HYPERTENSION: Chronic | Status: ACTIVE | Noted: 2017-07-10

## 2019-02-08 PROBLEM — K21.9 GASTRO-ESOPHAGEAL REFLUX DISEASE WITHOUT ESOPHAGITIS: Chronic | Status: ACTIVE | Noted: 2017-07-10

## 2019-02-21 ENCOUNTER — OUTPATIENT (OUTPATIENT)
Dept: OUTPATIENT SERVICES | Facility: HOSPITAL | Age: 54
LOS: 1 days | End: 2019-02-21
Payer: MEDICAID

## 2019-02-21 VITALS
HEART RATE: 75 BPM | WEIGHT: 149.91 LBS | SYSTOLIC BLOOD PRESSURE: 144 MMHG | OXYGEN SATURATION: 98 % | RESPIRATION RATE: 14 BRPM | DIASTOLIC BLOOD PRESSURE: 93 MMHG | HEIGHT: 63 IN | TEMPERATURE: 98 F

## 2019-02-21 DIAGNOSIS — I25.10 ATHEROSCLEROTIC HEART DISEASE OF NATIVE CORONARY ARTERY WITHOUT ANGINA PECTORIS: ICD-10-CM

## 2019-02-21 LAB
ALBUMIN SERPL ELPH-MCNC: 4.8 G/DL — SIGNIFICANT CHANGE UP (ref 3.3–5)
ALP SERPL-CCNC: 59 U/L — SIGNIFICANT CHANGE UP (ref 40–120)
ALT FLD-CCNC: 46 U/L — HIGH (ref 10–45)
ANION GAP SERPL CALC-SCNC: 11 MMOL/L — SIGNIFICANT CHANGE UP (ref 5–17)
AST SERPL-CCNC: 28 U/L — SIGNIFICANT CHANGE UP (ref 10–40)
BILIRUB SERPL-MCNC: 0.9 MG/DL — SIGNIFICANT CHANGE UP (ref 0.2–1.2)
BUN SERPL-MCNC: 15 MG/DL — SIGNIFICANT CHANGE UP (ref 7–23)
CALCIUM SERPL-MCNC: 9.7 MG/DL — SIGNIFICANT CHANGE UP (ref 8.4–10.5)
CHLORIDE SERPL-SCNC: 102 MMOL/L — SIGNIFICANT CHANGE UP (ref 96–108)
CO2 SERPL-SCNC: 28 MMOL/L — SIGNIFICANT CHANGE UP (ref 22–31)
CREAT SERPL-MCNC: 0.78 MG/DL — SIGNIFICANT CHANGE UP (ref 0.5–1.3)
GLUCOSE SERPL-MCNC: 98 MG/DL — SIGNIFICANT CHANGE UP (ref 70–99)
HCT VFR BLD CALC: 50.9 % — HIGH (ref 39–50)
HGB BLD-MCNC: 17.3 G/DL — HIGH (ref 13–17)
MCHC RBC-ENTMCNC: 29.7 PG — SIGNIFICANT CHANGE UP (ref 27–34)
MCHC RBC-ENTMCNC: 33.9 GM/DL — SIGNIFICANT CHANGE UP (ref 32–36)
MCV RBC AUTO: 87.6 FL — SIGNIFICANT CHANGE UP (ref 80–100)
PLATELET # BLD AUTO: 172 K/UL — SIGNIFICANT CHANGE UP (ref 150–400)
POTASSIUM SERPL-MCNC: 4.9 MMOL/L — SIGNIFICANT CHANGE UP (ref 3.5–5.3)
POTASSIUM SERPL-SCNC: 4.9 MMOL/L — SIGNIFICANT CHANGE UP (ref 3.5–5.3)
PROT SERPL-MCNC: 7.8 G/DL — SIGNIFICANT CHANGE UP (ref 6–8.3)
RBC # BLD: 5.81 M/UL — HIGH (ref 4.2–5.8)
RBC # FLD: 12.3 % — SIGNIFICANT CHANGE UP (ref 10.3–14.5)
SODIUM SERPL-SCNC: 141 MMOL/L — SIGNIFICANT CHANGE UP (ref 135–145)
WBC # BLD: 6.6 K/UL — SIGNIFICANT CHANGE UP (ref 3.8–10.5)
WBC # FLD AUTO: 6.6 K/UL — SIGNIFICANT CHANGE UP (ref 3.8–10.5)

## 2019-02-21 PROCEDURE — 93458 L HRT ARTERY/VENTRICLE ANGIO: CPT

## 2019-02-21 PROCEDURE — ZZZZZ: CPT

## 2019-02-21 PROCEDURE — 99152 MOD SED SAME PHYS/QHP 5/>YRS: CPT

## 2019-02-21 PROCEDURE — C1769: CPT

## 2019-02-21 PROCEDURE — 93005 ELECTROCARDIOGRAM TRACING: CPT

## 2019-02-21 PROCEDURE — C1887: CPT

## 2019-02-21 PROCEDURE — C1894: CPT

## 2019-02-21 PROCEDURE — 85027 COMPLETE CBC AUTOMATED: CPT

## 2019-02-21 PROCEDURE — 99152 MOD SED SAME PHYS/QHP 5/>YRS: CPT | Mod: GC

## 2019-02-21 PROCEDURE — 93010 ELECTROCARDIOGRAM REPORT: CPT

## 2019-02-21 PROCEDURE — 93458 L HRT ARTERY/VENTRICLE ANGIO: CPT | Mod: 26,GC

## 2019-02-21 PROCEDURE — 80053 COMPREHEN METABOLIC PANEL: CPT

## 2019-02-21 RX ORDER — SODIUM CHLORIDE 9 MG/ML
3 INJECTION INTRAMUSCULAR; INTRAVENOUS; SUBCUTANEOUS EVERY 8 HOURS
Qty: 0 | Refills: 0 | Status: DISCONTINUED | OUTPATIENT
Start: 2019-02-21 | End: 2019-03-08

## 2019-02-21 RX ORDER — METOPROLOL TARTRATE 50 MG
1 TABLET ORAL
Qty: 0 | Refills: 0 | COMMUNITY

## 2019-02-21 RX ORDER — SIMVASTATIN 20 MG/1
1 TABLET, FILM COATED ORAL
Qty: 0 | Refills: 0 | COMMUNITY

## 2019-02-21 RX ORDER — TICAGRELOR 90 MG/1
1 TABLET ORAL
Qty: 0 | Refills: 0 | COMMUNITY

## 2019-02-21 NOTE — H&P CARDIOLOGY - HISTORY OF PRESENT ILLNESS
50 yo M with PMH of acute MI, HTN, HLD and GERD who was seen in [facility] with increased trops and EKG changes and transferred to [facility] for ACS rule out. He initially felt chest pain with radiation down left arm in [place] on June 29th and was diagnosed with an acute MI. He was loaded with Brilinta and ASA and told that it would be several weeks before he could get an echo or cath. He flew back to the [place] on Saturday to seek more medical care. He currently endorses chest pressure and dizziness when walking ~10 blocks. Symptoms resolve with rest. He denies SOB, orthopnea, LE edema, nausea, vomiting, extremity tingling or weakness. He reports that he has been taking brilinta, asa and crestor for the past 2 weeks. He does not currently see a PMD because he has no insurance. 52 yo [place] M with PMH of acute MI, HTN, HLD and GERD, CAD with prior stent to mid and prox RCA (2017,[facility]) presents today for cardiac cath. Patient reports dyspnea with mild to moderate exertion associated with midsternal non radiating chest pain x 2 weeks ago when walking up hill. Patient seen and evaluated by cardiologist Dr. Toledo, recommends to have cardiac angiogram for further cardiac evaluation.       Cards: BHARAT [place] [place]

## 2019-02-21 NOTE — H&P CARDIOLOGY - FAMILY HISTORY
Father  Still living? No  Family history of MI (myocardial infarction), Age at diagnosis: 61-70 Father  Still living? No  Family history of MI (myocardial infarction), Age at diagnosis: 61-70     Mother  Still living? Unknown  Family history of breast cancer, Age at diagnosis: Age Unknown

## 2019-02-21 NOTE — H&P CARDIOLOGY - PMH
GERD (gastroesophageal reflux disease)    HTN (hypertension)    Hyperlipidemia, unspecified hyperlipidemia type CAD (coronary artery disease)    GERD (gastroesophageal reflux disease)    HTN (hypertension)    Hyperlipidemia, unspecified hyperlipidemia type

## 2021-08-24 NOTE — ED PROVIDER NOTE - NS ED ROS FT
Addended by: CATHERINE CAT on: 8/24/2021 03:59 PM     Modules accepted: Orders    
Addended by: IGNACIO QUAN on: 2/24/2021 02:11 PM     Modules accepted: Orders    
ROS: GENERAL: no fevers, no chills HEENT: no epistaxis, no eye pain, no ear pain, no throat pain CARDIAC: no chest pain, no shortness of breath PULM: no cough, no shortness of breath GI: +epigastric pain, no nausea, no vomiting, no diarrhea, no abdominal pain, no hematemesis, no bright red blood per rectum : no dysuria, no hematuria EXTREMITIES: no arm pain, no leg pain, no back pain SKIN: no purpura, no petechiae NEURO: +dizziness, no headache, no neck pain, no loss of strength/sensation HEME: no easy bruising, no easy bleeding

## 2024-05-28 NOTE — PATIENT PROFILE ADULT. - FUNCTIONAL SCREEN CURRENT LEVEL: COMMUNICATION, MLM
This patient has a personal history of adenomatous colon polyps and is now seen for colorectal surveillance/screening.  The patient is seen for EGD evaluation of   heartburn and gastric polyps  .
(0) understands/communicates without difficulty